# Patient Record
Sex: MALE | Race: ASIAN | NOT HISPANIC OR LATINO | Employment: FULL TIME | URBAN - METROPOLITAN AREA
[De-identification: names, ages, dates, MRNs, and addresses within clinical notes are randomized per-mention and may not be internally consistent; named-entity substitution may affect disease eponyms.]

---

## 2017-01-28 ENCOUNTER — HOSPITAL ENCOUNTER (OUTPATIENT)
Dept: LAB | Facility: MEDICAL CENTER | Age: 42
End: 2017-01-28
Attending: FAMILY MEDICINE
Payer: COMMERCIAL

## 2017-01-28 DIAGNOSIS — E87.6 HYPOKALEMIA: ICD-10-CM

## 2017-01-28 DIAGNOSIS — E78.5 DYSLIPIDEMIA: ICD-10-CM

## 2017-01-28 LAB
ALBUMIN SERPL BCP-MCNC: 4.7 G/DL (ref 3.2–4.9)
ALBUMIN/GLOB SERPL: 1.7 G/DL
ALP SERPL-CCNC: 56 U/L (ref 30–99)
ALT SERPL-CCNC: 32 U/L (ref 2–50)
ANION GAP SERPL CALC-SCNC: 10 MMOL/L (ref 0–11.9)
AST SERPL-CCNC: 30 U/L (ref 12–45)
BILIRUB SERPL-MCNC: 0.7 MG/DL (ref 0.1–1.5)
BUN SERPL-MCNC: 17 MG/DL (ref 8–22)
CALCIUM SERPL-MCNC: 9.2 MG/DL (ref 8.5–10.5)
CHLORIDE SERPL-SCNC: 104 MMOL/L (ref 96–112)
CHOLEST SERPL-MCNC: 143 MG/DL (ref 100–199)
CO2 SERPL-SCNC: 26 MMOL/L (ref 20–33)
CREAT SERPL-MCNC: 0.99 MG/DL (ref 0.5–1.4)
GLOBULIN SER CALC-MCNC: 2.7 G/DL (ref 1.9–3.5)
GLUCOSE SERPL-MCNC: 89 MG/DL (ref 65–99)
HDLC SERPL-MCNC: 46 MG/DL
LDLC SERPL CALC-MCNC: 86 MG/DL
POTASSIUM SERPL-SCNC: 3.4 MMOL/L (ref 3.6–5.5)
PROT SERPL-MCNC: 7.4 G/DL (ref 6–8.2)
SODIUM SERPL-SCNC: 140 MMOL/L (ref 135–145)
TRIGL SERPL-MCNC: 57 MG/DL (ref 0–149)

## 2017-01-28 PROCEDURE — 80053 COMPREHEN METABOLIC PANEL: CPT

## 2017-01-28 PROCEDURE — 80061 LIPID PANEL: CPT

## 2017-01-28 PROCEDURE — 36415 COLL VENOUS BLD VENIPUNCTURE: CPT

## 2017-02-06 ENCOUNTER — OFFICE VISIT (OUTPATIENT)
Dept: MEDICAL GROUP | Facility: PHYSICIAN GROUP | Age: 42
End: 2017-02-06
Payer: COMMERCIAL

## 2017-02-06 VITALS
BODY MASS INDEX: 25.48 KG/M2 | RESPIRATION RATE: 18 BRPM | OXYGEN SATURATION: 96 % | DIASTOLIC BLOOD PRESSURE: 80 MMHG | TEMPERATURE: 98.4 F | SYSTOLIC BLOOD PRESSURE: 152 MMHG | WEIGHT: 182 LBS | HEART RATE: 67 BPM | HEIGHT: 71 IN

## 2017-02-06 DIAGNOSIS — E78.5 DYSLIPIDEMIA: ICD-10-CM

## 2017-02-06 DIAGNOSIS — I10 ESSENTIAL HYPERTENSION: ICD-10-CM

## 2017-02-06 DIAGNOSIS — Z00.00 ROUTINE PHYSICAL EXAMINATION: ICD-10-CM

## 2017-02-06 DIAGNOSIS — E87.6 HYPOKALEMIA: ICD-10-CM

## 2017-02-06 PROCEDURE — 99396 PREV VISIT EST AGE 40-64: CPT | Performed by: FAMILY MEDICINE

## 2017-02-06 RX ORDER — AMLODIPINE BESYLATE 2.5 MG/1
2.5 TABLET ORAL DAILY
Qty: 30 TAB | Refills: 2 | Status: SHIPPED | OUTPATIENT
Start: 2017-02-06 | End: 2017-03-20 | Stop reason: SDUPTHER

## 2017-02-06 NOTE — MR AVS SNAPSHOT
"        Bertrand Platt   2017 9:20 AM   Office Visit   MRN: [id number]    Department:  [facility]   Dept Phone:  [phone]    Description:  Male : 1975   Provider:  Allyssa Cortez M.D.           Reason for Visit     Follow-Up     Results labs       Allergies as of 2017     No Known Allergies      You were diagnosed with     Routine physical examination   [828249]       Essential hypertension   [6580771]       Dyslipidemia   [983393]         Vital Signs     Blood Pressure Pulse Temperature Respirations Height Weight    152/80 mmHg 67 36.9 °C (98.4 °F) 18 1.803 m (5' 11\") 82.555 kg (182 lb)    Body Mass Index Oxygen Saturation Smoking Status             25.40 kg/m2 96% Former Smoker         Basic Information     Date Of Birth Sex Race Ethnicity Preferred Language    1975 Male  Non- English      Your appointments     Mar 20, 2017  9:40 AM   Established Patient with Allyssa Cortez M.D.   [facility] (--)    [address]  [address]  [facility] [id number] [phone]           You will be receiving a confirmation call a few days before your appointment from our automated call confirmation system.              Problem List              ICD-10-CM Priority Class Noted - Resolved    Dyslipidemia E78.5   2014 - Present    Essential hypertension I10   2016 - Present      Health Maintenance        Date Due Completion Dates    IMM INFLUENZA (1) 2018 (Originally 2016) 10/27/2014    IMM DTaP/Tdap/Td Vaccine (2 - Td) 2026            Current Immunizations     Influenza Vaccine Quad Inj (Pf) 10/27/2014    Tdap Vaccine 2016      Below and/or attached are the medications your provider expects you to take. Review all of your home medications and newly ordered medications with your provider and/or pharmacist. Follow medication instructions as directed by your provider and/or pharmacist. Please keep your medication list with you and share with " your provider. Update the information when medications are discontinued, doses are changed, or new medications (including over-the-counter products) are added; and carry medication information at all times in the event of emergency situations     Allergies:  No Known Allergies          Medications  Valid as of: February 06, 2017 -  9:47 AM    Generic Name Brand Name Tablet Size Instructions for use    AmLODIPine Besylate (Tab) NORVASC 2.5 MG Take 1 Tab by mouth every day.        Cephalexin (Cap) KEFLEX 500 MG Take 1 Cap by mouth 4 times a day.        Lisinopril (Tab) PRINIVIL, ZESTRIL 40 MG Take 1 Tab by mouth every day.        .                 Medicines prescribed today were sent to:     [facility]/PHARMACY #[id number] - [place], [place] - [address] [facility] AT [address] [address]    Phone: [phone] Fax: [phone]    Open 24 Hours?: No      Medication refill instructions:       If your prescription bottle indicates you have medication refills left, it is not necessary to call your provider’s office. Please contact your pharmacy and they will refill your medication.    If your prescription bottle indicates you do not have any refills left, you may request refills at any time through one of the following ways: The online [employer] system (except Urgent Care), by calling your provider’s office, or by asking your pharmacy to contact your provider’s office with a refill request. Medication refills are processed only during regular business hours and may not be available until the next business day. Your provider may request additional information or to have a follow-up visit with you prior to refilling your medication.   *Please Note: Medication refills are assigned a new Rx number when refilled electronically. Your pharmacy may indicate that no refills were authorized even though a new prescription for the same medication is available at the pharmacy. Please request the medicine by name  with the pharmacy before contacting your provider for a refill.           MyChart Access Code: Activation code not generated  Current MyChart Status: Active

## 2017-02-06 NOTE — PROGRESS NOTES
Subjective:      Bertrand Platt is a 41 y.o. male who presents with Follow-Up and Results            HPI     Patient returns routine physical exam and for follow-up of his medical problems and to go over his lab results.    In the past he was on medication for hypertension. He was able to control his blood pressure without the need for medication. When I saw him 4 months ago his blood pressure was already slightly elevated. I did not start him back on the medication at that time and I had him work on getting this controlled with his on efforts. He has not been checking his blood pressure at home. His blood pressure is higher today than the last visit. He denies any headache, dizziness, lightheadedness, chest pain, palpitations, shortness of breath, leg edema.    We also follow him for his dyslipidemia. He is managing this with his own efforts.    I reviewed the following    Past Medical History   Diagnosis Date   • HTN (hypertension)         No past surgical history on file.    No Known Allergies    Current Outpatient Prescriptions   Medication Sig Dispense Refill   • amlodipine (NORVASC) 2.5 MG Tab Take 1 Tab by mouth every day. 30 Tab 2   • cephALEXin (KEFLEX) 500 MG CAPS Take 1 Cap by mouth 4 times a day. 40 Cap 0   • lisinopril (PRINIVIL, ZESTRIL) 40 MG tablet Take 1 Tab by mouth every day. 30 Tab 5     No current facility-administered medications for this visit.        Family History   Problem Relation Age of Onset   • Hyperlipidemia Mother    • Hypertension Mother    • Diabetes Mother        Social History     Social History   • Marital Status:      Spouse Name: N/A   • Number of Children: 2   • Years of Education: N/A     Occupational History   •  [employer]     Social History Main Topics   • Smoking status: Former Smoker -- 4 years     Types: Cigarettes     Quit date: 06/08/2002   • Smokeless tobacco: Never Used      Comment: smoked 3 cig/day, quit in 2003   • Alcohol Use: 0.0  "oz/week     0 Standard drinks or equivalent per week      Comment: occasional   • Drug Use: No   • Sexual Activity:     Partners: Female     Other Topics Concern   • Not on file     Social History Narrative        ROS    Review of Systems  Constitutional: Negative for fever, chills, weight loss and malaise/fatigue.   HEENT: Negative for ear pain, nosebleeds, congestion, sore throat and neck pain.    Eyes: Negative for blurred vision.   Respiratory: Negative for cough, sputum production, shortness of breath and wheezing.    Cardiovascular: Negative for chest pain, palpitations, orthopnea and leg swelling.   Gastrointestinal: Negative for heartburn, nausea, vomiting and abdominal pain.   Genitourinary: Negative for dysuria, urgency and frequency.   Musculoskeletal: Negative for myalgias, back pain and joint pain.   Skin: Negative for rash and itching.   Neurological: Negative for dizziness, tingling, tremors, sensory change, focal weakness and headaches.   Endo/Heme/Allergies: Does not bruise/bleed easily.   Psychiatric/Behavioral: Negative for depression, anxiety, or memory loss.     All other systems reviewed and are negative except as in HPI.     Objective:     /80 mmHg  Pulse 67  Temp(Src) 36.9 °C (98.4 °F)  Resp 18  Ht 1.803 m (5' 11\")  Wt 82.555 kg (182 lb)  BMI 25.40 kg/m2  SpO2 96%     Physical Exam   Constitutional: He is oriented to person, place, and time. He appears well-developed and well-nourished. No distress.   HENT:   Head: Normocephalic and atraumatic.   Right Ear: External ear normal.   Left Ear: External ear normal.   Nose: Nose normal.   Mouth/Throat: Oropharynx is clear and moist. No oropharyngeal exudate.   Eyes: Conjunctivae and EOM are normal. Pupils are equal, round, and reactive to light. Right eye exhibits no discharge. Left eye exhibits no discharge. No scleral icterus.   Neck: Normal range of motion. Neck supple. No JVD present. No tracheal deviation present. No thyromegaly " present.   Cardiovascular: Normal rate, regular rhythm, normal heart sounds and intact distal pulses.  Exam reveals no gallop and no friction rub.    No murmur heard.  Pulmonary/Chest: Effort normal. No stridor. No respiratory distress. He has no wheezes. He has no rales. He exhibits no tenderness.   Abdominal: Soft. Bowel sounds are normal. He exhibits no distension and no mass. There is no tenderness. There is no rebound and no guarding. No hernia. Hernia confirmed negative in the right inguinal area and confirmed negative in the left inguinal area.   Genitourinary: Testes normal and penis normal. Right testis shows no mass, no swelling and no tenderness. Right testis is descended. Left testis shows no mass, no swelling and no tenderness. Left testis is descended. Circumcised. No penile erythema or penile tenderness. No discharge found.   Musculoskeletal: Normal range of motion. He exhibits no edema or tenderness.   Lymphadenopathy:     He has no cervical adenopathy. No inguinal adenopathy noted on the right or left side.   Neurological: He is alert and oriented to person, place, and time. He has normal reflexes. He displays normal reflexes. No cranial nerve deficit. He exhibits normal muscle tone. Coordination normal.   Skin: Skin is warm. No rash noted. He is not diaphoretic. No erythema. No pallor.   Psychiatric: He has a normal mood and affect. His behavior is normal. Judgment and thought content normal.        Hospital Outpatient Visit on 01/28/2017   Component Date Value   • Cholesterol,Tot 01/28/2017 143    • Triglycerides 01/28/2017 57    • HDL 01/28/2017 46    • LDL 01/28/2017 86    • Sodium 01/28/2017 140    • Potassium 01/28/2017 3.4*previously 3.5    • Chloride 01/28/2017 104    • Co2 01/28/2017 26    • Anion Gap 01/28/2017 10.0    • Glucose 01/28/2017 89    • Bun 01/28/2017 17    • Creatinine 01/28/2017 0.99    • Calcium 01/28/2017 9.2    • AST(SGOT) 01/28/2017 30    • ALT(SGPT) 01/28/2017 32    •  Alkaline Phosphatase 01/28/2017 56    • Total Bilirubin 01/28/2017 0.7    • Albumin 01/28/2017 4.7    • Total Protein 01/28/2017 7.4    • Globulin 01/28/2017 2.7    • A-G Ratio 01/28/2017 1.7    • GFR If  01/28/2017 >60    • GFR If Non  Ameri* 01/28/2017 >60                Assessment/Plan:     1. Routine physical examination  Complete exam was done. Up-to-date with tdap. He refuses flu shot.    2. Essential hypertension  Blood pressure is consistently slightly high without the medications. I will start him back on low-dose amlodipine 2.5 mg one tablet daily. Monitor blood pressures at home and keep a record and bring it on his follow-up. I will reevaluate in a month. Advised exercise, watching the salt intake.  - amlodipine (NORVASC) 2.5 MG Tab; Take 1 Tab by mouth every day.  Dispense: 30 Tab; Refill: 2    3. Dyslipidemia  All at target. LDL is even better than before. Ten-year cardiovascular disease risk is 1.4%. No need to treat with medication.    4. Hypokalemia.  No recent diarrhea, vomiting. He is not on medication that would make him lose potassium. Advised increase potassium-rich foods. I will do follow-up blood work to check the potassium next visit.      Please note that this dictation was created using voice recognition software. I have worked with consultants from the vendor as well as technical experts from [facility]  [employer] to optimize the interface. I have made every reasonable attempt to correct obvious errors, but I expect that there are errors of grammar and possibly content I did not discover before finalizing the note.

## 2017-03-13 ENCOUNTER — HOSPITAL ENCOUNTER (OUTPATIENT)
Dept: LAB | Facility: MEDICAL CENTER | Age: 42
End: 2017-03-13
Attending: FAMILY MEDICINE
Payer: COMMERCIAL

## 2017-03-13 DIAGNOSIS — E87.6 HYPOKALEMIA: ICD-10-CM

## 2017-03-13 LAB — POTASSIUM SERPL-SCNC: 3.6 MMOL/L (ref 3.6–5.5)

## 2017-03-13 PROCEDURE — 36415 COLL VENOUS BLD VENIPUNCTURE: CPT

## 2017-03-13 PROCEDURE — 84132 ASSAY OF SERUM POTASSIUM: CPT

## 2017-03-20 ENCOUNTER — OFFICE VISIT (OUTPATIENT)
Dept: MEDICAL GROUP | Facility: PHYSICIAN GROUP | Age: 42
End: 2017-03-20
Payer: COMMERCIAL

## 2017-03-20 VITALS
OXYGEN SATURATION: 94 % | WEIGHT: 182 LBS | TEMPERATURE: 98.2 F | HEIGHT: 71 IN | SYSTOLIC BLOOD PRESSURE: 130 MMHG | HEART RATE: 69 BPM | DIASTOLIC BLOOD PRESSURE: 86 MMHG | RESPIRATION RATE: 16 BRPM | BODY MASS INDEX: 25.48 KG/M2

## 2017-03-20 DIAGNOSIS — I10 ESSENTIAL HYPERTENSION: ICD-10-CM

## 2017-03-20 DIAGNOSIS — E87.6 HYPOKALEMIA: ICD-10-CM

## 2017-03-20 PROCEDURE — 99213 OFFICE O/P EST LOW 20 MIN: CPT | Performed by: FAMILY MEDICINE

## 2017-03-20 RX ORDER — AMLODIPINE BESYLATE 2.5 MG/1
2.5 TABLET ORAL DAILY
Qty: 30 TAB | Refills: 5 | Status: SHIPPED | OUTPATIENT
Start: 2017-03-20 | End: 2017-10-05 | Stop reason: SDUPTHER

## 2017-03-20 ASSESSMENT — PAIN SCALES - GENERAL: PAINLEVEL: NO PAIN

## 2017-03-20 NOTE — MR AVS SNAPSHOT
"Bertrand Platt   3/20/2017 9:40 AM   Office Visit   MRN: [id number]    Department:  [facility]   Dept Phone:  [phone]    Description:  Male : 1975   Provider:  Allyssa Cortez M.D.           Reason for Visit     Follow-Up 1 Month       Allergies as of 3/20/2017     No Known Allergies      You were diagnosed with     Essential hypertension   [0160331]       Hypokalemia   [[id number]]         Vital Signs     Blood Pressure Pulse Temperature Respirations Height Weight    130/86 mmHg 69 36.8 °C (98.2 °F) 16 1.803 m (5' 11\") 82.555 kg (182 lb)    Body Mass Index Oxygen Saturation Smoking Status             25.40 kg/m2 94% Former Smoker         Basic Information     Date Of Birth Sex Race Ethnicity Preferred Language    1975 Male  Non- English      Your appointments     2017 10:00 AM   Established Patient with Allyssa Cortez M.D.   [facility] (--)    [address]  [address]  [facility] [id number] [phone]           You will be receiving a confirmation call a few days before your appointment from our automated call confirmation system.              Problem List              ICD-10-CM Priority Class Noted - Resolved    Dyslipidemia E78.5   2014 - Present    Essential hypertension I10   2016 - Present      Health Maintenance        Date Due Completion Dates    IMM INFLUENZA (1) 2018 (Originally 2016) 10/27/2014    IMM DTaP/Tdap/Td Vaccine (2 - Td) 2026            Current Immunizations     Influenza Vaccine Quad Inj (Pf) 10/27/2014    Tdap Vaccine 2016      Below and/or attached are the medications your provider expects you to take. Review all of your home medications and newly ordered medications with your provider and/or pharmacist. Follow medication instructions as directed by your provider and/or pharmacist. Please keep your medication list with you and share with your provider. Update the information when medications " are discontinued, doses are changed, or new medications (including over-the-counter products) are added; and carry medication information at all times in the event of emergency situations     Allergies:  No Known Allergies          Medications  Valid as of: March 20, 2017 -  9:51 AM    Generic Name Brand Name Tablet Size Instructions for use    AmLODIPine Besylate (Tab) NORVASC 2.5 MG Take 1 Tab by mouth every day.        Cephalexin (Cap) KEFLEX 500 MG Take 1 Cap by mouth 4 times a day.        Lisinopril (Tab) PRINIVIL, ZESTRIL 40 MG Take 1 Tab by mouth every day.        .                 Medicines prescribed today were sent to:     [facility]/PHARMACY #[id number] - [place], [place] - [address] [place] [address]    Phone: [phone] Fax: [phone]    Open 24 Hours?: No      Medication refill instructions:       If your prescription bottle indicates you have medication refills left, it is not necessary to call your provider’s office. Please contact your pharmacy and they will refill your medication.    If your prescription bottle indicates you do not have any refills left, you may request refills at any time through one of the following ways: The online [employer] system (except Urgent Care), by calling your provider’s office, or by asking your pharmacy to contact your provider’s office with a refill request. Medication refills are processed only during regular business hours and may not be available until the next business day. Your provider may request additional information or to have a follow-up visit with you prior to refilling your medication.   *Please Note: Medication refills are assigned a new Rx number when refilled electronically. Your pharmacy may indicate that no refills were authorized even though a new prescription for the same medication is available at the pharmacy. Please request the medicine by name with the pharmacy before contacting your provider for a  refill.           MyChart Access Code: Activation code not generated  Current [employer] Status: Active

## 2017-03-20 NOTE — PROGRESS NOTES
"Subjective:      Bertrand Platt is a 42 y.o. male who presents with Follow-Up            HPI     Patient returns for follow-up. I started him on low-dose amlodipine for hypertension about a month and a half ago since his blood pressure started to run high. In the past he was on 2 agents which were lisinopril and amlodipine but he was able to get his blood pressure under control with his own efforts and so we discontinued the medications. He has been checking his blood pressures at home and he said most of the time they're running in the 130s over 70s to 80s. Occasionally it goes up to 139/85-87 and one time diastolic was 92 which was the highest reading. He denies any ankle or leg swelling from the medication. He denies any headache, dizziness, lightheadedness, chest pain, palpitations, shortness of breath.    We also had him do follow-up blood work because he had slightly low potassium level. He did not have any diarrhea or vomiting that would explain the low number. I had him eat potassium-rich foods.    Past medical history, past surgical history, family history reviewed-no changes    Social history reviewed-no changes    Allergies reviewed-no changes    Medications reviewed-no changes    ROS     Review of systems as per history of present illness, the rest are negative.       Objective:     /86 mmHg  Pulse 69  Temp(Src) 36.8 °C (98.2 °F)  Resp 16  Ht 1.803 m (5' 11\")  Wt 82.555 kg (182 lb)  BMI 25.40 kg/m2  SpO2 94%     Physical Exam     Examined alert, awake, oriented, not in distress    Neck-supple, no lymphadenopathy, no thyromegaly  Lungs-clear to auscultation, no rales, no wheezes  Heart-regular rate and rhythm, no murmur  Extremities-no edema, clubbing, cyanosis     Hospital Outpatient Visit on 03/13/2017   Component Date Value   • Potassium 03/13/2017 3.6         Assessment/Plan:     1. Essential hypertension  Blood pressure is now down to goal. Continue amlodipine and he is only on 2.5 mg " daily. I will recheck in 3 months. He will continue to monitor his blood pressures at home and keep a record and bring on his return. Advised watching his salt intake, regular exercise and weight loss.  - amlodipine (NORVASC) 2.5 MG Tab; Take 1 Tab by mouth every day.  Dispense: 30 Tab; Refill: 5    2. Hypokalemia  This is now normal. Continue to eat potassium-rich foods.      Please note that this dictation was created using voice recognition software. I have worked with consultants from the vendor as well as technical experts from [employer] to optimize the interface. I have made every reasonable attempt to correct obvious errors, but I expect that there are errors of grammar and possibly content I did not discover before finalizing the note.

## 2017-07-03 ENCOUNTER — OFFICE VISIT (OUTPATIENT)
Dept: MEDICAL GROUP | Facility: PHYSICIAN GROUP | Age: 42
End: 2017-07-03
Payer: COMMERCIAL

## 2017-07-03 VITALS
OXYGEN SATURATION: 97 % | HEART RATE: 55 BPM | HEIGHT: 71 IN | BODY MASS INDEX: 25.62 KG/M2 | WEIGHT: 183 LBS | DIASTOLIC BLOOD PRESSURE: 74 MMHG | TEMPERATURE: 98.6 F | RESPIRATION RATE: 16 BRPM | SYSTOLIC BLOOD PRESSURE: 130 MMHG

## 2017-07-03 DIAGNOSIS — E78.5 DYSLIPIDEMIA: ICD-10-CM

## 2017-07-03 DIAGNOSIS — I10 ESSENTIAL HYPERTENSION: ICD-10-CM

## 2017-07-03 PROCEDURE — 99213 OFFICE O/P EST LOW 20 MIN: CPT | Performed by: FAMILY MEDICINE

## 2017-07-03 NOTE — MR AVS SNAPSHOT
"        Bertrand Platt   7/3/2017 10:00 AM   Office Visit   MRN: [id number]    Department:  [facility]   Dept Phone:  [phone]    Description:  Male : 1975   Provider:  Allyssa Cortez M.D.           Reason for Visit     Follow-Up 3 month follow up     Medication Refill           Allergies as of 7/3/2017     No Known Allergies      You were diagnosed with     Essential hypertension   [2913911]       Dyslipidemia   [459170]         Vital Signs     Blood Pressure Pulse Temperature Respirations Height Weight    130/74 mmHg 55 37 °C (98.6 °F) 16 1.803 m (5' 11\") 83.008 kg (183 lb)    Body Mass Index Oxygen Saturation Smoking Status             25.53 kg/m2 97% Former Smoker         Basic Information     Date Of Birth Sex Race Ethnicity Preferred Language    1975 Male  Non- English      Your appointments     2018 10:00 AM   Established Patient with Allyssa Cortez M.D.   [facility] (--)    [address]  [address]  [facility] [id number] [phone]           You will be receiving a confirmation call a few days before your appointment from our automated call confirmation system.              Problem List              ICD-10-CM Priority Class Noted - Resolved    Dyslipidemia E78.5   2014 - Present    Essential hypertension I10   2016 - Present      Health Maintenance        Date Due Completion Dates    IMM INFLUENZA (1) 2018 (Originally 2017) 10/27/2014    IMM DTaP/Tdap/Td Vaccine (2 - Td) 2026            Current Immunizations     Influenza Vaccine Quad Inj (Pf) 10/27/2014    Tdap Vaccine 2016      Below and/or attached are the medications your provider expects you to take. Review all of your home medications and newly ordered medications with your provider and/or pharmacist. Follow medication instructions as directed by your provider and/or pharmacist. Please keep your medication list with you and share with your provider. Update " the information when medications are discontinued, doses are changed, or new medications (including over-the-counter products) are added; and carry medication information at all times in the event of emergency situations     Allergies:  No Known Allergies          Medications  Valid as of: July 03, 2017 - 10:38 AM    Generic Name Brand Name Tablet Size Instructions for use    AmLODIPine Besylate (Tab) NORVASC 2.5 MG Take 1 Tab by mouth every day.        Cephalexin (Cap) KEFLEX 500 MG Take 1 Cap by mouth 4 times a day.        Lisinopril (Tab) PRINIVIL, ZESTRIL 40 MG Take 1 Tab by mouth every day.        .                 Medicines prescribed today were sent to:     [facility]/PHARMACY #[id number] - [place], [place] - [address] [place] [address]    Phone: [phone] Fax: [phone]    Open 24 Hours?: No      Medication refill instructions:       If your prescription bottle indicates you have medication refills left, it is not necessary to call your provider’s office. Please contact your pharmacy and they will refill your medication.    If your prescription bottle indicates you do not have any refills left, you may request refills at any time through one of the following ways: The online [employer] system (except Urgent Care), by calling your provider’s office, or by asking your pharmacy to contact your provider’s office with a refill request. Medication refills are processed only during regular business hours and may not be available until the next business day. Your provider may request additional information or to have a follow-up visit with you prior to refilling your medication.   *Please Note: Medication refills are assigned a new Rx number when refilled electronically. Your pharmacy may indicate that no refills were authorized even though a new prescription for the same medication is available at the pharmacy. Please request the medicine by name with the pharmacy before  contacting your provider for a refill.        Your To Do List     Future Labs/Procedures Complete By Expires    CBC WITH DIFFERENTIAL  As directed 7/4/2018    COMP METABOLIC PANEL  As directed 7/4/2018    LIPID PROFILE  As directed 7/4/2018         MyChart Access Code: Activation code not generated  Current MyChart Status: Active

## 2017-07-04 NOTE — PROGRESS NOTES
"Subjective:      Bertrand Platt is a 42 y.o. male who presents with Follow-Up and Medication Refill            HPI     Patient is here for follow-up of his hypertension and dyslipidemia.    In terms of his hypertension, this is under control on low-dose amlodipine 2.5 mg daily. He denies any side effects. He denies any headache, dizziness, lightheadedness, chest pain, palpitations, shortness of breath, leg edema.    In terms of his dyslipidemia, he was able to get the LDL down from 130s to 80s with his own efforts only.    Past medical history, past surgical history, family history reviewed-no changes    Social history reviewed-no changes    Allergies reviewed-no changes    Medications reviewed-no changes    ROS     Review of systems as per history of present illness, the rest are negative.     Objective:     /74 mmHg  Pulse 55  Temp(Src) 37 °C (98.6 °F)  Resp 16  Ht 1.803 m (5' 11\")  Wt 83.008 kg (183 lb)  BMI 25.53 kg/m2  SpO2 97%     Physical Exam     Examined alert, awake, oriented, not in distress    Neck-supple, no lymphadenopathy, no thyromegaly  Lungs-clear to auscultation, no rales, no wheezes  Heart-regular rate and rhythm, no murmur  Extremities-no edema, clubbing, cyanosis            Assessment/Plan:     1. Essential hypertension  Controlled on low-dose amlodipine.  - LIPID PROFILE; Future  - COMP METABOLIC PANEL; Future  - CBC WITH DIFFERENTIAL; Future    2. Dyslipidemia  We will do follow-up blood work to make sure that this continues to be where it should be with his own efforts.  - LIPID PROFILE; Future  - COMP METABOLIC PANEL; Future  - CBC WITH DIFFERENTIAL; Future      Follow-up in 6 months.  "

## 2017-10-05 DIAGNOSIS — I10 ESSENTIAL HYPERTENSION: ICD-10-CM

## 2017-10-05 RX ORDER — AMLODIPINE BESYLATE 2.5 MG/1
2.5 TABLET ORAL DAILY
Qty: 90 TAB | Refills: 1 | Status: SHIPPED | OUTPATIENT
Start: 2017-10-05 | End: 2018-04-23 | Stop reason: SDUPTHER

## 2018-01-08 ENCOUNTER — OFFICE VISIT (OUTPATIENT)
Dept: MEDICAL GROUP | Facility: PHYSICIAN GROUP | Age: 43
End: 2018-01-08
Payer: COMMERCIAL

## 2018-01-08 VITALS
SYSTOLIC BLOOD PRESSURE: 138 MMHG | WEIGHT: 186 LBS | DIASTOLIC BLOOD PRESSURE: 88 MMHG | RESPIRATION RATE: 12 BRPM | TEMPERATURE: 97.9 F | HEIGHT: 71 IN | OXYGEN SATURATION: 98 % | BODY MASS INDEX: 26.04 KG/M2 | HEART RATE: 63 BPM

## 2018-01-08 DIAGNOSIS — E78.5 DYSLIPIDEMIA: ICD-10-CM

## 2018-01-08 DIAGNOSIS — I10 ESSENTIAL HYPERTENSION: ICD-10-CM

## 2018-01-08 PROCEDURE — 99213 OFFICE O/P EST LOW 20 MIN: CPT | Performed by: FAMILY MEDICINE

## 2018-01-08 ASSESSMENT — PAIN SCALES - GENERAL: PAINLEVEL: NO PAIN

## 2018-01-08 ASSESSMENT — PATIENT HEALTH QUESTIONNAIRE - PHQ9: CLINICAL INTERPRETATION OF PHQ2 SCORE: 0

## 2018-01-09 NOTE — PROGRESS NOTES
"Subjective:      Bertrand Platt is a 42 y.o. male who presents with Follow-Up (6 Month )            HPI     Patient is here for follow-up of his medical problems.    In terms of his hypertension, he continues to take amlodipine 2.5 mg daily without side effects. He has not been checking his blood pressure at home. Today his diastolic blood pressure is borderline elevated.    In terms of the dyslipidemia, he is managing this with his own efforts. He was able to get LDL down from 131-86 in January 2017 blood work. He has not done the follow-up blood work I ordered to be done before this visit.     Past medical history, past surgical history, family history reviewed-no changes    Social history reviewed-no changes    Allergies reviewed-no changes    Medications reviewed-no changes    ROS     Review of systems as per history of present illness, the rest are negative.       Objective:     /88   Pulse 63   Temp 36.6 °C (97.9 °F)   Resp 12   Ht 1.803 m (5' 11\")   Wt 84.4 kg (186 lb)   SpO2 98%   BMI 25.94 kg/m²      Physical Exam     Examined alert, awake, oriented, not in distress    Neck-supple, no lymphadenopathy, no thyromegaly  Lungs-clear to auscultation, no rales, no wheezes  Heart-regular rate and rhythm, no murmur  Extremities-no edema, clubbing, cyanosis          Assessment/Plan:     1. Essential hypertension  We need to get this adequately controlled. He will monitor his blood pressures at home and keep a record and bring it on his follow-up. Advised to watch the salt intake, exercise regularly and keep a healthy weight. If we don't see blood pressure improvement next visit we will increase the dose of the amlodipine.    2. Dyslipidemia  He will do follow-up blood work as soon as possible to check his lipid panel. We will calculate 10 year cardiovascular disease risk and advise statin if needed.      Please note that this dictation was created using voice recognition software. I have worked with " consultants from the vendor as well as technical experts from [facility] to optimize the interface. I have made every reasonable attempt to correct obvious errors, but I expect that there are errors of grammar and possibly content I did not discover before finalizing the note.

## 2018-04-23 DIAGNOSIS — I10 ESSENTIAL HYPERTENSION: ICD-10-CM

## 2018-04-23 RX ORDER — AMLODIPINE BESYLATE 2.5 MG/1
2.5 TABLET ORAL DAILY
Qty: 90 TAB | Refills: 1 | Status: SHIPPED | OUTPATIENT
Start: 2018-04-23 | End: 2018-09-30 | Stop reason: SDUPTHER

## 2018-04-23 RX ORDER — AMLODIPINE BESYLATE 2.5 MG/1
2.5 TABLET ORAL DAILY
Qty: 90 TAB | Refills: 0 | Status: CANCELLED | OUTPATIENT
Start: 2018-04-23

## 2018-04-23 NOTE — TELEPHONE ENCOUNTER
Pt called asking about medication refill but didn't give a name of medication.  Called and lvm for him to return my call so we can get his medication

## 2018-05-07 ENCOUNTER — HOSPITAL ENCOUNTER (OUTPATIENT)
Dept: LAB | Facility: MEDICAL CENTER | Age: 43
End: 2018-05-07
Attending: FAMILY MEDICINE
Payer: COMMERCIAL

## 2018-05-07 DIAGNOSIS — I10 ESSENTIAL HYPERTENSION: ICD-10-CM

## 2018-05-07 DIAGNOSIS — E78.5 DYSLIPIDEMIA: ICD-10-CM

## 2018-05-07 LAB
ALBUMIN SERPL BCP-MCNC: 4.4 G/DL (ref 3.2–4.9)
ALBUMIN/GLOB SERPL: 1.5 G/DL
ALP SERPL-CCNC: 51 U/L (ref 30–99)
ALT SERPL-CCNC: 41 U/L (ref 2–50)
ANION GAP SERPL CALC-SCNC: 10 MMOL/L (ref 0–11.9)
AST SERPL-CCNC: 32 U/L (ref 12–45)
BASOPHILS # BLD AUTO: 0.5 % (ref 0–1.8)
BASOPHILS # BLD: 0.03 K/UL (ref 0–0.12)
BILIRUB SERPL-MCNC: 0.7 MG/DL (ref 0.1–1.5)
BUN SERPL-MCNC: 21 MG/DL (ref 8–22)
CALCIUM SERPL-MCNC: 9.3 MG/DL (ref 8.5–10.5)
CHLORIDE SERPL-SCNC: 106 MMOL/L (ref 96–112)
CHOLEST SERPL-MCNC: 147 MG/DL (ref 100–199)
CO2 SERPL-SCNC: 23 MMOL/L (ref 20–33)
CREAT SERPL-MCNC: 1 MG/DL (ref 0.5–1.4)
EOSINOPHIL # BLD AUTO: 0.12 K/UL (ref 0–0.51)
EOSINOPHIL NFR BLD: 1.9 % (ref 0–6.9)
ERYTHROCYTE [DISTWIDTH] IN BLOOD BY AUTOMATED COUNT: 38.9 FL (ref 35.9–50)
GLOBULIN SER CALC-MCNC: 2.9 G/DL (ref 1.9–3.5)
GLUCOSE SERPL-MCNC: 100 MG/DL (ref 65–99)
HCT VFR BLD AUTO: 48 % (ref 42–52)
HDLC SERPL-MCNC: 43 MG/DL
HGB BLD-MCNC: 16.3 G/DL (ref 14–18)
IMM GRANULOCYTES # BLD AUTO: 0.02 K/UL (ref 0–0.11)
IMM GRANULOCYTES NFR BLD AUTO: 0.3 % (ref 0–0.9)
LDLC SERPL CALC-MCNC: 73 MG/DL
LYMPHOCYTES # BLD AUTO: 1.99 K/UL (ref 1–4.8)
LYMPHOCYTES NFR BLD: 31.5 % (ref 22–41)
MCH RBC QN AUTO: 29.3 PG (ref 27–33)
MCHC RBC AUTO-ENTMCNC: 34 G/DL (ref 33.7–35.3)
MCV RBC AUTO: 86.3 FL (ref 81.4–97.8)
MONOCYTES # BLD AUTO: 0.38 K/UL (ref 0–0.85)
MONOCYTES NFR BLD AUTO: 6 % (ref 0–13.4)
NEUTROPHILS # BLD AUTO: 3.77 K/UL (ref 1.82–7.42)
NEUTROPHILS NFR BLD: 59.8 % (ref 44–72)
NRBC # BLD AUTO: 0 K/UL
NRBC BLD-RTO: 0 /100 WBC
PLATELET # BLD AUTO: 259 K/UL (ref 164–446)
PMV BLD AUTO: 9.9 FL (ref 9–12.9)
POTASSIUM SERPL-SCNC: 3.9 MMOL/L (ref 3.6–5.5)
PROT SERPL-MCNC: 7.3 G/DL (ref 6–8.2)
RBC # BLD AUTO: 5.56 M/UL (ref 4.7–6.1)
SODIUM SERPL-SCNC: 139 MMOL/L (ref 135–145)
TRIGL SERPL-MCNC: 153 MG/DL (ref 0–149)
WBC # BLD AUTO: 6.3 K/UL (ref 4.8–10.8)

## 2018-05-07 PROCEDURE — 80061 LIPID PANEL: CPT

## 2018-05-07 PROCEDURE — 80053 COMPREHEN METABOLIC PANEL: CPT

## 2018-05-07 PROCEDURE — 36415 COLL VENOUS BLD VENIPUNCTURE: CPT

## 2018-05-07 PROCEDURE — 85025 COMPLETE CBC W/AUTO DIFF WBC: CPT

## 2018-08-06 ENCOUNTER — OFFICE VISIT (OUTPATIENT)
Dept: MEDICAL GROUP | Facility: PHYSICIAN GROUP | Age: 43
End: 2018-08-06
Payer: COMMERCIAL

## 2018-08-06 VITALS
BODY MASS INDEX: 26.32 KG/M2 | WEIGHT: 188 LBS | RESPIRATION RATE: 12 BRPM | HEIGHT: 71 IN | TEMPERATURE: 97.9 F | HEART RATE: 72 BPM | SYSTOLIC BLOOD PRESSURE: 110 MMHG | DIASTOLIC BLOOD PRESSURE: 76 MMHG | OXYGEN SATURATION: 97 %

## 2018-08-06 DIAGNOSIS — I10 ESSENTIAL HYPERTENSION: ICD-10-CM

## 2018-08-06 DIAGNOSIS — M21.611 BILATERAL BUNIONS: ICD-10-CM

## 2018-08-06 DIAGNOSIS — E78.5 DYSLIPIDEMIA: ICD-10-CM

## 2018-08-06 DIAGNOSIS — M21.612 BILATERAL BUNIONS: ICD-10-CM

## 2018-08-06 PROCEDURE — 99214 OFFICE O/P EST MOD 30 MIN: CPT | Performed by: NURSE PRACTITIONER

## 2018-08-06 NOTE — ASSESSMENT & PLAN NOTE
Taking amlodipine 2.5 mg daily for the past 4 years.  BP today 110/76.  No chest pain. No shortness of breath.  Exercises approximately 30 minutes most days.

## 2018-08-06 NOTE — ASSESSMENT & PLAN NOTE
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  Noticed growth/bunions for several years, but recently they started to hurt.  Referral to podiatry requested.  No fever, chills or redness noted.

## 2018-08-06 NOTE — ASSESSMENT & PLAN NOTE
Triglycerides at 153.  Cholesterol 147 normal range.  Encouraged patient to exercise and watch white carbohydrates.

## 2018-08-06 NOTE — PROGRESS NOTES
"Bertrand Platt is a 43 y.o.[place] male here today to establish care and for evaluation and management of:    HPI:    Essential hypertension  Taking amlodipine 2.5 mg daily for the past 4 years.  BP today 110/76.  No chest pain. No shortness of breath.  Exercises approximately 30 minutes most days.     Dyslipidemia  Triglycerides at 153.  Cholesterol 147 normal range.  Encouraged patient to exercise and watch white carbohydrates.     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  Noticed growth/bunions for several years, but recently they started to hurt.  Referral to podiatry requested.  No fever, chills or redness noted.       Current medicines (including changes today)  Current Outpatient Prescriptions   Medication Sig Dispense Refill   • amLODIPine (NORVASC) 2.5 MG Tab Take 1 Tab by mouth every day. 90 Tab 1     No current facility-administered medications for this visit.        He  has a past medical history of HTN (hypertension) and Hypertension.    He  has no past surgical history on file.    Social History   Substance Use Topics   • Smoking status: Former Smoker     Years: 4.00     Types: Cigarettes     Quit date: 2002   • Smokeless tobacco: Never Used      Comment: smoked 3 cig/day, quit in    • Alcohol use No       Social History     Social History Narrative   • No narrative on file       Family History   Problem Relation Age of Onset   • Hyperlipidemia Mother    • Hypertension Mother    • Diabetes Mother        Family Status   Relation Status   • Mo Alive   • Fa    • Sis Alive   • Bro Alive   • Sis Alive   • Sis Alive         ROS  As stated in hpi  All other systems reviewed and are negative     Objective:     Blood pressure 110/76, pulse 72, temperature 36.6 °C (97.9 °F), resp. rate 12, height 1.803 m (5' 11\"), weight 85.3 kg (188 lb), SpO2 97 %. Body mass index is 26.22 kg/m².  Physical Exam:    Constitutional: Alert, no distress.  Skin: Warm, dry, good turgor, no " rashes in visible areas.  Eye: Equal, round and reactive, conjunctiva clear, lids normal.  ENMT: Lips without lesions, good dentition, oropharynx clear.  Neck: Trachea midline, no masses, no thyromegaly. No cervical or supraclavicular lymphadenopathy.  Respiratory: Unlabored respiratory effort, lungs clear to auscultation, no wheezes, no ronchi.  Cardiovascular: Normal S1, S2, no murmur, no edema.  Abdomen: Soft, non-tender, no masses, no hepatosplenomegaly.  Psych: Alert and oriented x3, normal affect and mood.  MSK:  Right foot has large bunion on Great toe.  Left smaller bunion noted.       Assessment and Plan:   The following treatment plan was discussed    1. Essential hypertension  This is a new problem to me.  Chronic, ongoing. Stable. BP at goal.  Continue amolodipine 2.5 mg daily.  Discussed dietary salt, sugars, daily exercise.  Monitor and follow.     2. Dyslipidemia  This is a new problem to me.  Chronic.  Triglycerides elevated.  Discussed diet, exercise watching white carbohydrates.  Non-drinker.  Monitor and follow.     3. Bilateral bunions  This is a new problem to me.  Chronic, ongoing, exacerbation with 12 hour work shifts.  Referral placed to podiatry.  Monitor.   - REFERRAL TO PODIATRY      Records requested.  Followup: Return in about 1 year (around 8/6/2019) for Annual.

## 2018-09-30 DIAGNOSIS — I10 ESSENTIAL HYPERTENSION: ICD-10-CM

## 2018-10-01 RX ORDER — AMLODIPINE BESYLATE 2.5 MG/1
2.5 TABLET ORAL DAILY
Qty: 90 TAB | Refills: 0 | Status: SHIPPED | OUTPATIENT
Start: 2018-10-01 | End: 2018-10-24 | Stop reason: SDUPTHER

## 2018-10-25 ENCOUNTER — PATIENT MESSAGE (OUTPATIENT)
Dept: MEDICAL GROUP | Facility: PHYSICIAN GROUP | Age: 43
End: 2018-10-25

## 2018-10-25 DIAGNOSIS — I10 ESSENTIAL HYPERTENSION: ICD-10-CM

## 2019-02-11 ENCOUNTER — OFFICE VISIT (OUTPATIENT)
Dept: MEDICAL GROUP | Facility: PHYSICIAN GROUP | Age: 44
End: 2019-02-11
Payer: COMMERCIAL

## 2019-02-11 VITALS
HEIGHT: 71 IN | WEIGHT: 182.32 LBS | BODY MASS INDEX: 25.52 KG/M2 | DIASTOLIC BLOOD PRESSURE: 90 MMHG | HEART RATE: 63 BPM | SYSTOLIC BLOOD PRESSURE: 140 MMHG | TEMPERATURE: 97.9 F | OXYGEN SATURATION: 97 %

## 2019-02-11 DIAGNOSIS — E78.5 DYSLIPIDEMIA: ICD-10-CM

## 2019-02-11 DIAGNOSIS — I10 ESSENTIAL HYPERTENSION: ICD-10-CM

## 2019-02-11 DIAGNOSIS — L72.3 SEBACEOUS CYST: ICD-10-CM

## 2019-02-11 PROCEDURE — 99214 OFFICE O/P EST MOD 30 MIN: CPT | Performed by: FAMILY MEDICINE

## 2019-02-11 RX ORDER — AMLODIPINE BESYLATE 5 MG/1
5 TABLET ORAL DAILY
Qty: 90 TAB | Refills: 1 | Status: SHIPPED | OUTPATIENT
Start: 2019-02-11 | End: 2019-05-20

## 2019-02-11 ASSESSMENT — PATIENT HEALTH QUESTIONNAIRE - PHQ9: CLINICAL INTERPRETATION OF PHQ2 SCORE: 0

## 2019-02-11 NOTE — PROGRESS NOTES
"Subjective:      Bertrand Platt is a 43 y.o. male who presents with Mass (Pimple like in groin area)        HPI:    Groin Mass  The patient is a 43 y.o. Male who presents with a small growth on his upper left medial thigh, that appeared 6 months ago. It has mildly grown in size. He states it is occasionally sensitive, but denies any pruritis. He describes the lesion as a bump, similar to a pimple. He notes that a relative was recently diagnosed with lymphoma, and he is worried the mass may be cancerous.     Hypertension  The last time I saw this patient was a year ago.  The patient's hypertension is managed with amlodipine 2.5mg daily.  He went to establish with KANIKA Wood in August 2018.  His last two refills were done byKANIKA sam.  He is mildly hypertensive today at 130/80. Repeat pressure is 140/100. He has not been checking his blood pressure at home. His last check was 6 months ago.     Dyslipidemia  Patient has history of mild dyslipidemia and he was able to get this under control with his own efforts.  The last lipid panel was in May 2018 and at that time the LDL was below 100 but the triglycerides were mildly elevated.    Vaccinations  The patient has not received an influenza vaccine this season, and does not want one. He notes that the last time he had a flu shot was in 2014, and it made him sick.  He is up-to-date with Tdap.        Past medical history, past surgical history, family history reviewed-no changes    Social history reviewed-no changes    Allergies reviewed-no changes    Medications reviewed-no changes      ROS:  As per the HPI as shown above, the rest are negative.       Objective:     /80 (BP Location: Left arm, Patient Position: Sitting, BP Cuff Size: Adult)   Pulse 63   Temp 36.6 °C (97.9 °F) (Temporal)   Ht 1.803 m (5' 11\")   Wt 82.7 kg (182 lb 5.1 oz)   SpO2 97%   BMI 25.43 kg/m²     Physical Exam  Examined alert, awake, oriented, not in distress    Skin- 1x1 cm " subcutaneous nodule, consistent with sebaceous cyst, to the left medial proximal thigh. No overlying erythema of the skin. Non-tender.  Neck-supple, no lymphadenopathy, no thyromegaly  Lungs-clear to auscultation, no rales, no wheezes  Heart-regular rate and rhythm, no murmur  Extremities-no edema, clubbing, cyanosis       Assessment/Plan:   1. Sebaceous cyst  I believe the patient's groin mass is a small sebaceous cyst, 1x1 cm in diameter. I am not concerned for cancer or STDs. No intervention needed at this time.  Discussed with patient the natural course of the problem.  There is no potential for this to become cancerous or malignant.  We can keep an eye on this since this is small and he has no significant discomfort or pain.  - Lipid Profile; Future  - Comp Metabolic Panel; Future  - CBC WITH DIFFERENTIAL; Future    2. Essential hypertension  The patient's blood pressure was elevated today at 130/80. Repeat pressure was 140/100.  Made aware that with the new 2017 guidelines we need to get the blood pressure below 130/80.  I will increase the patient's dose of amlodipine to 5mg daily.  Monitor blood pressure at home and keep a record.  I will reevaluate in 2 months.  We will send him for blood work.  - Lipid Profile; Future  - Comp Metabolic Panel; Future  - CBC WITH DIFFERENTIAL; Future  - amLODIPine (NORVASC) 5 MG Tab; Take 1 Tab by mouth every day.  Dispense: 90 Tab; Refill: 1    3. Dyslipidemia  The patient's blood sugar and triglycerides were mildly elevated in May of 2018. I will recheck his lab work to check for signs of prediabetes and dyslipidemia.  - Lipid Profile; Future  - Comp Metabolic Panel; Future  - CBC WITH DIFFERENTIAL; Future        Follow up with me in 2 months, with repeat lab work.         Guadalupe VALLECILLO (Scribe), am scribing for, and in the presence of, Allyssa Cortez MD     Electronically signed by: Guadalupe Donahue (Scribe), 2/11/2019    Allyssa VALLECILLO MD personally performed  the services described in this documentation, as scribed by Guadalupe Donahue in my presence, and it is both accurate and complete.

## 2019-05-11 ENCOUNTER — HOSPITAL ENCOUNTER (OUTPATIENT)
Dept: LAB | Facility: MEDICAL CENTER | Age: 44
End: 2019-05-11
Attending: FAMILY MEDICINE
Payer: COMMERCIAL

## 2019-05-11 DIAGNOSIS — L72.3 SEBACEOUS CYST: ICD-10-CM

## 2019-05-11 DIAGNOSIS — I10 ESSENTIAL HYPERTENSION: ICD-10-CM

## 2019-05-11 DIAGNOSIS — E78.5 DYSLIPIDEMIA: ICD-10-CM

## 2019-05-11 LAB
ALBUMIN SERPL BCP-MCNC: 4.5 G/DL (ref 3.2–4.9)
ALBUMIN/GLOB SERPL: 1.6 G/DL
ALP SERPL-CCNC: 43 U/L (ref 30–99)
ALT SERPL-CCNC: 28 U/L (ref 2–50)
ANION GAP SERPL CALC-SCNC: 9 MMOL/L (ref 0–11.9)
AST SERPL-CCNC: 29 U/L (ref 12–45)
BASOPHILS # BLD AUTO: 0.4 % (ref 0–1.8)
BASOPHILS # BLD: 0.03 K/UL (ref 0–0.12)
BILIRUB SERPL-MCNC: 0.8 MG/DL (ref 0.1–1.5)
BUN SERPL-MCNC: 18 MG/DL (ref 8–22)
CALCIUM SERPL-MCNC: 9.2 MG/DL (ref 8.5–10.5)
CHLORIDE SERPL-SCNC: 105 MMOL/L (ref 96–112)
CHOLEST SERPL-MCNC: 161 MG/DL (ref 100–199)
CO2 SERPL-SCNC: 27 MMOL/L (ref 20–33)
CREAT SERPL-MCNC: 1.04 MG/DL (ref 0.5–1.4)
EOSINOPHIL # BLD AUTO: 0.2 K/UL (ref 0–0.51)
EOSINOPHIL NFR BLD: 2.6 % (ref 0–6.9)
ERYTHROCYTE [DISTWIDTH] IN BLOOD BY AUTOMATED COUNT: 40.6 FL (ref 35.9–50)
FASTING STATUS PATIENT QL REPORTED: NORMAL
GLOBULIN SER CALC-MCNC: 2.8 G/DL (ref 1.9–3.5)
GLUCOSE SERPL-MCNC: 78 MG/DL (ref 65–99)
HCT VFR BLD AUTO: 47.1 % (ref 42–52)
HDLC SERPL-MCNC: 48 MG/DL
HGB BLD-MCNC: 15.5 G/DL (ref 14–18)
IMM GRANULOCYTES # BLD AUTO: 0.02 K/UL (ref 0–0.11)
IMM GRANULOCYTES NFR BLD AUTO: 0.3 % (ref 0–0.9)
LDLC SERPL CALC-MCNC: 99 MG/DL
LYMPHOCYTES # BLD AUTO: 3.56 K/UL (ref 1–4.8)
LYMPHOCYTES NFR BLD: 46.5 % (ref 22–41)
MCH RBC QN AUTO: 29.2 PG (ref 27–33)
MCHC RBC AUTO-ENTMCNC: 32.9 G/DL (ref 33.7–35.3)
MCV RBC AUTO: 88.7 FL (ref 81.4–97.8)
MONOCYTES # BLD AUTO: 0.61 K/UL (ref 0–0.85)
MONOCYTES NFR BLD AUTO: 8 % (ref 0–13.4)
NEUTROPHILS # BLD AUTO: 3.24 K/UL (ref 1.82–7.42)
NEUTROPHILS NFR BLD: 42.2 % (ref 44–72)
NRBC # BLD AUTO: 0 K/UL
NRBC BLD-RTO: 0 /100 WBC
PLATELET # BLD AUTO: 266 K/UL (ref 164–446)
PMV BLD AUTO: 9.5 FL (ref 9–12.9)
POTASSIUM SERPL-SCNC: 3.8 MMOL/L (ref 3.6–5.5)
PROT SERPL-MCNC: 7.3 G/DL (ref 6–8.2)
RBC # BLD AUTO: 5.31 M/UL (ref 4.7–6.1)
SODIUM SERPL-SCNC: 141 MMOL/L (ref 135–145)
TRIGL SERPL-MCNC: 72 MG/DL (ref 0–149)
WBC # BLD AUTO: 7.7 K/UL (ref 4.8–10.8)

## 2019-05-11 PROCEDURE — 80053 COMPREHEN METABOLIC PANEL: CPT

## 2019-05-11 PROCEDURE — 80061 LIPID PANEL: CPT

## 2019-05-11 PROCEDURE — 36415 COLL VENOUS BLD VENIPUNCTURE: CPT

## 2019-05-11 PROCEDURE — 85025 COMPLETE CBC W/AUTO DIFF WBC: CPT

## 2019-05-16 ENCOUNTER — APPOINTMENT (OUTPATIENT)
Dept: MEDICAL GROUP | Facility: PHYSICIAN GROUP | Age: 44
End: 2019-05-16
Payer: COMMERCIAL

## 2019-05-20 ENCOUNTER — OFFICE VISIT (OUTPATIENT)
Dept: MEDICAL GROUP | Facility: PHYSICIAN GROUP | Age: 44
End: 2019-05-20
Payer: COMMERCIAL

## 2019-05-20 VITALS
HEART RATE: 66 BPM | HEIGHT: 71 IN | WEIGHT: 187.61 LBS | DIASTOLIC BLOOD PRESSURE: 80 MMHG | BODY MASS INDEX: 26.27 KG/M2 | TEMPERATURE: 97.8 F | SYSTOLIC BLOOD PRESSURE: 150 MMHG | OXYGEN SATURATION: 98 %

## 2019-05-20 DIAGNOSIS — I10 ESSENTIAL HYPERTENSION: ICD-10-CM

## 2019-05-20 DIAGNOSIS — E78.5 DYSLIPIDEMIA: ICD-10-CM

## 2019-05-20 PROCEDURE — 99213 OFFICE O/P EST LOW 20 MIN: CPT | Performed by: FAMILY MEDICINE

## 2019-05-20 RX ORDER — AMLODIPINE BESYLATE 10 MG/1
10 TABLET ORAL DAILY
Qty: 90 TAB | Refills: 1 | Status: SHIPPED | OUTPATIENT
Start: 2019-05-20 | End: 2019-12-01 | Stop reason: SDUPTHER

## 2019-05-20 NOTE — PROGRESS NOTES
"Subjective:      Bertrand Platt is a 44 y.o. male who presents with Follow-Up (htn)      HPI:    Patient presents today for 3-month follow-up of his chronic medical problems.     Hypertension  On his last visit, we increased his Amlodipine to 5 mg as his blood pressure was still elevated. He has since been taking this increased dosage without any side effects. He typically takes it around 4 in the afternoon as he works at night. Blood pressure levels in the office remain elevated at 150/80. Upon recheck, it is slightly lower at 140/90. He has not been checking his levels at home, but he does have a machine at home.     Dyslipidemia  He continues to manage his dyslipidemia through his own efforts. Blood work was completed prior to this visit.       Past medical history, past surgical history, family history reviewed-no changes    Social history reviewed-no changes    Allergies reviewed-no changes    Medications reviewed-no changes     ROS:  As per the HPI as shown above, the rest are negative.       Objective:     /80 (BP Location: Left arm, Patient Position: Sitting, BP Cuff Size: Adult)   Pulse 66   Temp 36.6 °C (97.8 °F) (Temporal)   Ht 1.803 m (5' 11\")   Wt 85.1 kg (187 lb 9.8 oz)   SpO2 98%   BMI 26.17 kg/m²     Physical Exam    Examined alert, awake, oriented, not in distress    Neck-supple, no lymphadenopathy, no thyromegaly  Lungs-clear to auscultation, no rales, no wheezes  Heart-regular rate and rhythm, no murmur  Extremities-no edema, clubbing, cyanosis      Labs:  Hospital Outpatient Visit on 05/11/2019   Component Date Value Ref Range Status   • Cholesterol,Tot 05/11/2019 161  100 - 199 mg/dL Final   • Triglycerides 05/11/2019 72  0 - 149 mg/dL Final   • HDL 05/11/2019 48  >=40 mg/dL Final   • LDL 05/11/2019 99  <100 mg/dL Final   • Sodium 05/11/2019 141  135 - 145 mmol/L Final   • Potassium 05/11/2019 3.8  3.6 - 5.5 mmol/L Final   • Chloride 05/11/2019 105  96 - 112 mmol/L Final   • Co2 " 05/11/2019 27  20 - 33 mmol/L Final   • Anion Gap 05/11/2019 9.0  0.0 - 11.9 Final   • Glucose 05/11/2019 78  65 - 99 mg/dL Final   • Bun 05/11/2019 18  8 - 22 mg/dL Final   • Creatinine 05/11/2019 1.04  0.50 - 1.40 mg/dL Final   • Calcium 05/11/2019 9.2  8.5 - 10.5 mg/dL Final   • AST(SGOT) 05/11/2019 29  12 - 45 U/L Final   • ALT(SGPT) 05/11/2019 28  2 - 50 U/L Final   • Alkaline Phosphatase 05/11/2019 43  30 - 99 U/L Final   • Total Bilirubin 05/11/2019 0.8  0.1 - 1.5 mg/dL Final   • Albumin 05/11/2019 4.5  3.2 - 4.9 g/dL Final   • Total Protein 05/11/2019 7.3  6.0 - 8.2 g/dL Final   • Globulin 05/11/2019 2.8  1.9 - 3.5 g/dL Final   • A-G Ratio 05/11/2019 1.6  g/dL Final   • WBC 05/11/2019 7.7  4.8 - 10.8 K/uL Final   • RBC 05/11/2019 5.31  4.70 - 6.10 M/uL Final   • Hemoglobin 05/11/2019 15.5  14.0 - 18.0 g/dL Final   • Hematocrit 05/11/2019 47.1  42.0 - 52.0 % Final   • MCV 05/11/2019 88.7  81.4 - 97.8 fL Final   • MCH 05/11/2019 29.2  27.0 - 33.0 pg Final   • MCHC 05/11/2019 32.9* 33.7 - 35.3 g/dL Final   • RDW 05/11/2019 40.6  35.9 - 50.0 fL Final   • Platelet Count 05/11/2019 266  164 - 446 K/uL Final   • MPV 05/11/2019 9.5  9.0 - 12.9 fL Final   • Neutrophils-Polys 05/11/2019 42.20* 44.00 - 72.00 % Final   • Lymphocytes 05/11/2019 46.50* 22.00 - 41.00 % Final   • Monocytes 05/11/2019 8.00  0.00 - 13.40 % Final   • Eosinophils 05/11/2019 2.60  0.00 - 6.90 % Final   • Basophils 05/11/2019 0.40  0.00 - 1.80 % Final   • Immature Granulocytes 05/11/2019 0.30  0.00 - 0.90 % Final   • Nucleated RBC 05/11/2019 0.00  /100 WBC Final   • Neutrophils (Absolute) 05/11/2019 3.24  1.82 - 7.42 K/uL Final    Includes immature neutrophils, if present.   • Lymphs (Absolute) 05/11/2019 3.56  1.00 - 4.80 K/uL Final   • Monos (Absolute) 05/11/2019 0.61  0.00 - 0.85 K/uL Final   • Eos (Absolute) 05/11/2019 0.20  0.00 - 0.51 K/uL Final   • Baso (Absolute) 05/11/2019 0.03  0.00 - 0.12 K/uL Final   • Immature Granulocytes (abs)  05/11/2019 0.02  0.00 - 0.11 K/uL Final   • NRBC (Absolute) 05/11/2019 0.00  K/uL Final   • Fasting Status 05/11/2019 Fasting   Final   • GFR If  05/11/2019 >60  >60 mL/min/1.73 m 2 Final   • GFR If Non  05/11/2019 >60  >60 mL/min/1.73 m 2 Final             Assessment/Plan:     1. Essential hypertension  Blood pressure remains elevated in the office today at 150/80 at the initial encounter at similar upon recheck at 140/90. This is similar to his last visit as well. We will increase his Amlodipine dosage again to 10 mg. Advised to look for peripheral edema. He will check his blood pressure daily before going into work, and bring in a record of his levels. He will also bring in his machine to his next visit for calibration.  We will follow-up with him in 3 months.  - amLODIPine (NORVASC) 10 MG Tab; Take 1 Tab by mouth every day.  Dispense: 90 Tab; Refill: 1    2. Dyslipidemia  Triglyceride levels have returned to normal dropping from 153 to 72. All other values are also within normal limits.  He will continue to manage this with his own efforts. I have advised the patient to increase his exercise regimen and avoid fatty foods. We will recheck with future lab tests.       Nikhil VALLECILLO (Scribe), am scribing for, and in the presence of, Allyssa Cortez MD     Electronically signed by: Nikhil Ferreira (Johnnyibe), 5/20/2019    IAllyssa MD personally performed the services described in this documentation, as scribed by Nikhil Ferreira in my presence, and it is both accurate and complete.

## 2019-08-26 ENCOUNTER — OFFICE VISIT (OUTPATIENT)
Dept: MEDICAL GROUP | Facility: PHYSICIAN GROUP | Age: 44
End: 2019-08-26
Payer: COMMERCIAL

## 2019-08-26 VITALS
HEART RATE: 71 BPM | TEMPERATURE: 97.4 F | BODY MASS INDEX: 26.14 KG/M2 | DIASTOLIC BLOOD PRESSURE: 60 MMHG | SYSTOLIC BLOOD PRESSURE: 126 MMHG | HEIGHT: 71 IN | OXYGEN SATURATION: 97 % | WEIGHT: 186.73 LBS

## 2019-08-26 DIAGNOSIS — I10 ESSENTIAL HYPERTENSION: ICD-10-CM

## 2019-08-26 PROCEDURE — 99212 OFFICE O/P EST SF 10 MIN: CPT | Performed by: FAMILY MEDICINE

## 2019-08-26 NOTE — PROGRESS NOTES
"Subjective:      Bertrand Platt is a 44 y.o. male who presents with Hypertension and Medication Refill (amlodipine)    HPI:    The patient is here for follow up on his hypertension.    Essential Hypertension  On his last visit, his blood pressure was still not well controlled at 150/60. We increased his Amlodipine dosage from 5 mg to 10 mg for his hypertension, and he has been compliant with this. He has not been having any side effects including peripheral edema. He monitored his blood pressure at home for the first month, and they were about 120-130/80 at home. His wife brought his machine into the office for calibration, and it was correct. Blood pressure in the office today is within goal at 126/60.      Past medical history, past surgical history, family history reviewed-no changes    Social history reviewed-no changes    Allergies reviewed-no changes    Medications reviewed-no changes      ROS:  As per the HPI as shown above, the rest are negative.       Objective:     /60 (BP Location: Left arm, Patient Position: Sitting, BP Cuff Size: Adult)   Pulse 71   Temp 36.3 °C (97.4 °F) (Temporal)   Ht 1.803 m (5' 11\")   Wt 84.7 kg (186 lb 11.7 oz)   SpO2 97%   BMI 26.04 kg/m²     Physical Exam    Examined alert, awake, oriented, not in distress    Neck-supple, no lymphadenopathy, no thyromegaly  Lungs-clear to auscultation, no rales, no wheezes  Heart-regular rate and rhythm, no murmur  Extremities-no edema, clubbing, cyanosis       Assessment/Plan:     1. Essential Hypertension  We previously increased his Amlodipine dosage from 5 mg to 10 mg. His blood pressure is now well-controlled at 126/60. We will continue the current dosage. I advised he monitor his levels at home, exercise frequently, and avoid salty foods.         INikhil (Scribe), am scribing for, and in the presence of, Allyssa Cortez MD     Electronically signed by: Nikhil Ferreira (Scribe), 8/26/2019    Allyssa VALLECILLO MD " personally performed the services described in this documentation, as scribed by Nikhil Ferreira in my presence, and it is both accurate and complete.

## 2019-12-01 DIAGNOSIS — I10 ESSENTIAL HYPERTENSION: ICD-10-CM

## 2019-12-02 RX ORDER — AMLODIPINE BESYLATE 10 MG/1
TABLET ORAL
Qty: 90 TAB | Refills: 1 | Status: SHIPPED | OUTPATIENT
Start: 2019-12-02 | End: 2020-06-15

## 2020-03-02 ENCOUNTER — APPOINTMENT (OUTPATIENT)
Dept: MEDICAL GROUP | Facility: PHYSICIAN GROUP | Age: 45
End: 2020-03-02
Payer: COMMERCIAL

## 2020-03-30 ENCOUNTER — OFFICE VISIT (OUTPATIENT)
Dept: MEDICAL GROUP | Facility: PHYSICIAN GROUP | Age: 45
End: 2020-03-30
Payer: COMMERCIAL

## 2020-03-30 VITALS
WEIGHT: 182.76 LBS | BODY MASS INDEX: 25.59 KG/M2 | HEART RATE: 73 BPM | OXYGEN SATURATION: 98 % | HEIGHT: 71 IN | SYSTOLIC BLOOD PRESSURE: 128 MMHG | TEMPERATURE: 97.8 F | DIASTOLIC BLOOD PRESSURE: 80 MMHG

## 2020-03-30 DIAGNOSIS — Z23 NEED FOR IMMUNIZATION AGAINST INFLUENZA: ICD-10-CM

## 2020-03-30 DIAGNOSIS — I10 ESSENTIAL HYPERTENSION: ICD-10-CM

## 2020-03-30 DIAGNOSIS — E78.5 DYSLIPIDEMIA: ICD-10-CM

## 2020-03-30 PROCEDURE — 90686 IIV4 VACC NO PRSV 0.5 ML IM: CPT | Performed by: FAMILY MEDICINE

## 2020-03-30 PROCEDURE — 99213 OFFICE O/P EST LOW 20 MIN: CPT | Mod: 25 | Performed by: FAMILY MEDICINE

## 2020-03-30 PROCEDURE — 90471 IMMUNIZATION ADMIN: CPT | Performed by: FAMILY MEDICINE

## 2020-03-30 ASSESSMENT — FIBROSIS 4 INDEX: FIB4 SCORE: 0.95

## 2020-03-30 ASSESSMENT — PATIENT HEALTH QUESTIONNAIRE - PHQ9: CLINICAL INTERPRETATION OF PHQ2 SCORE: 0

## 2020-03-30 NOTE — PROGRESS NOTES
"Subjective:      Bertrand Platt is a 45 y.o. male who presents with Hypertension        HPI:    The patient is here for follow up on his hypertension and dyslipidemia. The patient did not complete his blood work prior to this visit due to insurance cost. He notes that insurance will only cover yearly blood work.    Essential Hypertension  He takes amlodipine 10 mg for his hypertension without any side effects. During his last office visit in 8/2019, his blood pressure was well controlled at 126/60 with the increased amlodipine dose. His blood pressure machine was calibrated at the time and was correct. He has not been monitoring his blood pressure at home. He has recently increased his exercise. Blood pressure in the office is at 128/80.       Dyslipidemia  He continues to manage his dyslipidemia through his own efforts. His last blood work in 5/2019 showed all lipid values within normal limits. Since his last office visit, he has lost approximately 4 pounds. He has increased his exercise and recently cut out coffee and does not eat after work.       Past medical history, past surgical history, family history reviewed-no changes    Social history reviewed-no changes    Allergies reviewed-no changes    Medications reviewed-no changes     ROS:  As per the HPI as shown above, the rest are negative.       Objective:     /80 (BP Location: Left arm, Patient Position: Sitting, BP Cuff Size: Adult)   Pulse 73   Temp 36.6 °C (97.8 °F) (Temporal)   Ht 1.803 m (5' 11\")   Wt 82.9 kg (182 lb 12.2 oz)   SpO2 98%   BMI 25.49 kg/m²     Physical Exam  Examined alert, awake, oriented, not in distress    Neck-supple, no lymphadenopathy, no thyromegaly  Lungs-clear to auscultation, no rales, no wheezes  Heart-regular rate and rhythm, no murmur  Extremities-no edema, clubbing, cyanosis      Assessment/Plan:     1. Essential hypertension  Blood pressure under control. We will continue amlodipine 10 mg daily.. He is " encouraged to monitor his blood pressure at home with his machine. I have advised him to avoid salty foods and exercise regularly.   - Lipid Profile; Future  - Comp Metabolic Panel; Future  - CBC WITH DIFFERENTIAL; Future    2. Dyslipidemia  Last blood work in 5/2019 showed all lipid panel values within goal. He will continue to manage this through his own efforts. He is encouraged to continue his exercise regimen. Labs have been ordered for the next follow up visit.    - Lipid Profile; Future  - Comp Metabolic Panel; Future  - CBC WITH DIFFERENTIAL; Future    3. Need for immunization against influenza  Patient agrees to vaccination today. Vaccine was administered today without adverse event.   - Influenza Vaccine Quad Injection (PF)        Herman VALLECILLO (Scribe), am scribing for, and in the presence of, Allyssa Cortez MD     Electronically signed by: Herman Gallardo (Scribe), 3/30/2020    Allyssa VALLECILLO MD personally performed the services described in this documentation, as scribed by Herman Gallardo in my presence, and it is both accurate and complete.

## 2020-06-13 DIAGNOSIS — I10 ESSENTIAL HYPERTENSION: ICD-10-CM

## 2020-06-15 RX ORDER — AMLODIPINE BESYLATE 10 MG/1
TABLET ORAL
Qty: 90 TAB | Refills: 0 | Status: SHIPPED | OUTPATIENT
Start: 2020-06-15 | End: 2020-09-08

## 2020-09-06 DIAGNOSIS — I10 ESSENTIAL HYPERTENSION: ICD-10-CM

## 2020-09-08 RX ORDER — AMLODIPINE BESYLATE 10 MG/1
TABLET ORAL
Qty: 90 TAB | Refills: 1 | Status: SHIPPED | OUTPATIENT
Start: 2020-09-08 | End: 2020-09-13 | Stop reason: SDUPTHER

## 2020-09-21 ENCOUNTER — HOSPITAL ENCOUNTER (OUTPATIENT)
Dept: LAB | Facility: MEDICAL CENTER | Age: 45
End: 2020-09-21
Attending: FAMILY MEDICINE
Payer: COMMERCIAL

## 2020-09-21 DIAGNOSIS — I10 ESSENTIAL HYPERTENSION: ICD-10-CM

## 2020-09-21 DIAGNOSIS — E78.5 DYSLIPIDEMIA: ICD-10-CM

## 2020-09-21 LAB
ALBUMIN SERPL BCP-MCNC: 4.5 G/DL (ref 3.2–4.9)
ALBUMIN/GLOB SERPL: 1.7 G/DL
ALP SERPL-CCNC: 52 U/L (ref 30–99)
ALT SERPL-CCNC: 40 U/L (ref 2–50)
ANION GAP SERPL CALC-SCNC: 14 MMOL/L (ref 7–16)
AST SERPL-CCNC: 31 U/L (ref 12–45)
BASOPHILS # BLD AUTO: 0.5 % (ref 0–1.8)
BASOPHILS # BLD: 0.03 K/UL (ref 0–0.12)
BILIRUB SERPL-MCNC: 0.4 MG/DL (ref 0.1–1.5)
BUN SERPL-MCNC: 21 MG/DL (ref 8–22)
CALCIUM SERPL-MCNC: 9.1 MG/DL (ref 8.5–10.5)
CHLORIDE SERPL-SCNC: 103 MMOL/L (ref 96–112)
CHOLEST SERPL-MCNC: 132 MG/DL (ref 100–199)
CO2 SERPL-SCNC: 22 MMOL/L (ref 20–33)
CREAT SERPL-MCNC: 0.92 MG/DL (ref 0.5–1.4)
EOSINOPHIL # BLD AUTO: 0.05 K/UL (ref 0–0.51)
EOSINOPHIL NFR BLD: 0.9 % (ref 0–6.9)
ERYTHROCYTE [DISTWIDTH] IN BLOOD BY AUTOMATED COUNT: 42.5 FL (ref 35.9–50)
FASTING STATUS PATIENT QL REPORTED: NORMAL
GLOBULIN SER CALC-MCNC: 2.6 G/DL (ref 1.9–3.5)
GLUCOSE SERPL-MCNC: 96 MG/DL (ref 65–99)
HCT VFR BLD AUTO: 45.5 % (ref 42–52)
HDLC SERPL-MCNC: 47 MG/DL
HGB BLD-MCNC: 14.9 G/DL (ref 14–18)
IMM GRANULOCYTES # BLD AUTO: 0.02 K/UL (ref 0–0.11)
IMM GRANULOCYTES NFR BLD AUTO: 0.4 % (ref 0–0.9)
LDLC SERPL CALC-MCNC: 77 MG/DL
LYMPHOCYTES # BLD AUTO: 2.04 K/UL (ref 1–4.8)
LYMPHOCYTES NFR BLD: 36.6 % (ref 22–41)
MCH RBC QN AUTO: 29.3 PG (ref 27–33)
MCHC RBC AUTO-ENTMCNC: 32.7 G/DL (ref 33.7–35.3)
MCV RBC AUTO: 89.4 FL (ref 81.4–97.8)
MONOCYTES # BLD AUTO: 0.45 K/UL (ref 0–0.85)
MONOCYTES NFR BLD AUTO: 8.1 % (ref 0–13.4)
NEUTROPHILS # BLD AUTO: 2.99 K/UL (ref 1.82–7.42)
NEUTROPHILS NFR BLD: 53.5 % (ref 44–72)
NRBC # BLD AUTO: 0 K/UL
NRBC BLD-RTO: 0 /100 WBC
PLATELET # BLD AUTO: 262 K/UL (ref 164–446)
PMV BLD AUTO: 9.5 FL (ref 9–12.9)
POTASSIUM SERPL-SCNC: 3.8 MMOL/L (ref 3.6–5.5)
PROT SERPL-MCNC: 7.1 G/DL (ref 6–8.2)
RBC # BLD AUTO: 5.09 M/UL (ref 4.7–6.1)
SODIUM SERPL-SCNC: 139 MMOL/L (ref 135–145)
TRIGL SERPL-MCNC: 40 MG/DL (ref 0–149)
WBC # BLD AUTO: 5.6 K/UL (ref 4.8–10.8)

## 2020-09-21 PROCEDURE — 80061 LIPID PANEL: CPT

## 2020-09-21 PROCEDURE — 85025 COMPLETE CBC W/AUTO DIFF WBC: CPT

## 2020-09-21 PROCEDURE — 80053 COMPREHEN METABOLIC PANEL: CPT

## 2020-09-21 PROCEDURE — 36415 COLL VENOUS BLD VENIPUNCTURE: CPT

## 2020-09-28 ENCOUNTER — OFFICE VISIT (OUTPATIENT)
Dept: MEDICAL GROUP | Facility: PHYSICIAN GROUP | Age: 45
End: 2020-09-28
Payer: COMMERCIAL

## 2020-09-28 VITALS
HEART RATE: 71 BPM | DIASTOLIC BLOOD PRESSURE: 70 MMHG | OXYGEN SATURATION: 98 % | SYSTOLIC BLOOD PRESSURE: 130 MMHG | BODY MASS INDEX: 25.15 KG/M2 | WEIGHT: 179.68 LBS | HEIGHT: 71 IN | TEMPERATURE: 98 F

## 2020-09-28 DIAGNOSIS — E78.5 DYSLIPIDEMIA: ICD-10-CM

## 2020-09-28 DIAGNOSIS — I10 ESSENTIAL HYPERTENSION: ICD-10-CM

## 2020-09-28 PROCEDURE — 99213 OFFICE O/P EST LOW 20 MIN: CPT | Performed by: FAMILY MEDICINE

## 2020-09-28 ASSESSMENT — FIBROSIS 4 INDEX: FIB4 SCORE: 0.86

## 2020-09-28 NOTE — PROGRESS NOTES
"Subjective:      Bertrand Platt is a 45 y.o. male who presents with No chief complaint on file.            HPI     Patient returns for follow-up of hypertension and dyslipidemia.    In terms of the hypertension, he continues to take amlodipine 10 mg daily without side effects with good control of the blood pressure.    He continues to manage his dyslipidemia with his own efforts only.  Blood work was done before this visit.    He needs a flu shot but this is not available in the office today and so he will get it from a local pharmacy.    Past medical history, past surgical history, family history reviewed-no changes    Social history reviewed-no changes    Allergies reviewed-no changes    Medications reviewed-no changes    ROS     As per HPI, the rest are negative.       Objective:     /70 (BP Location: Left arm, Patient Position: Sitting, BP Cuff Size: Adult)   Pulse 71   Temp 36.7 °C (98 °F) (Temporal)   Ht 1.803 m (5' 11\")   Wt 81.5 kg (179 lb 10.8 oz)   SpO2 98%   BMI 25.06 kg/m²      Physical Exam     Examined alert, awake, oriented, not in distress    Neck-supple, no lymphadenopathy, no thyromegaly  Lungs-clear to auscultation, no rales, no wheezes  Heart-regular rate and rhythm, no murmur  Extremities-no edema, clubbing, cyanosis       Hospital Outpatient Visit on 09/21/2020   Component Date Value Ref Range Status   • WBC 09/21/2020 5.6  4.8 - 10.8 K/uL Final   • RBC 09/21/2020 5.09  4.70 - 6.10 M/uL Final   • Hemoglobin 09/21/2020 14.9  14.0 - 18.0 g/dL Final   • Hematocrit 09/21/2020 45.5  42.0 - 52.0 % Final   • MCV 09/21/2020 89.4  81.4 - 97.8 fL Final   • MCH 09/21/2020 29.3  27.0 - 33.0 pg Final   • MCHC 09/21/2020 32.7* 33.7 - 35.3 g/dL Final   • RDW 09/21/2020 42.5  35.9 - 50.0 fL Final   • Platelet Count 09/21/2020 262  164 - 446 K/uL Final   • MPV 09/21/2020 9.5  9.0 - 12.9 fL Final   • Neutrophils-Polys 09/21/2020 53.50  44.00 - 72.00 % Final   • Lymphocytes 09/21/2020 36.60  22.00 - " 41.00 % Final   • Monocytes 09/21/2020 8.10  0.00 - 13.40 % Final   • Eosinophils 09/21/2020 0.90  0.00 - 6.90 % Final   • Basophils 09/21/2020 0.50  0.00 - 1.80 % Final   • Immature Granulocytes 09/21/2020 0.40  0.00 - 0.90 % Final   • Nucleated RBC 09/21/2020 0.00  /100 WBC Final   • Neutrophils (Absolute) 09/21/2020 2.99  1.82 - 7.42 K/uL Final    Includes immature neutrophils, if present.   • Lymphs (Absolute) 09/21/2020 2.04  1.00 - 4.80 K/uL Final   • Monos (Absolute) 09/21/2020 0.45  0.00 - 0.85 K/uL Final   • Eos (Absolute) 09/21/2020 0.05  0.00 - 0.51 K/uL Final   • Baso (Absolute) 09/21/2020 0.03  0.00 - 0.12 K/uL Final   • Immature Granulocytes (abs) 09/21/2020 0.02  0.00 - 0.11 K/uL Final   • NRBC (Absolute) 09/21/2020 0.00  K/uL Final   • Sodium 09/21/2020 139  135 - 145 mmol/L Final   • Potassium 09/21/2020 3.8  3.6 - 5.5 mmol/L Final   • Chloride 09/21/2020 103  96 - 112 mmol/L Final   • Co2 09/21/2020 22  20 - 33 mmol/L Final   • Anion Gap 09/21/2020 14.0  7.0 - 16.0 Final   • Glucose 09/21/2020 96  65 - 99 mg/dL Final   • Bun 09/21/2020 21  8 - 22 mg/dL Final   • Creatinine 09/21/2020 0.92  0.50 - 1.40 mg/dL Final   • Calcium 09/21/2020 9.1  8.5 - 10.5 mg/dL Final   • AST(SGOT) 09/21/2020 31  12 - 45 U/L Final   • ALT(SGPT) 09/21/2020 40  2 - 50 U/L Final   • Alkaline Phosphatase 09/21/2020 52  30 - 99 U/L Final   • Total Bilirubin 09/21/2020 0.4  0.1 - 1.5 mg/dL Final   • Albumin 09/21/2020 4.5  3.2 - 4.9 g/dL Final   • Total Protein 09/21/2020 7.1  6.0 - 8.2 g/dL Final   • Globulin 09/21/2020 2.6  1.9 - 3.5 g/dL Final   • A-G Ratio 09/21/2020 1.7  g/dL Final   • Cholesterol,Tot 09/21/2020 132  100 - 199 mg/dL Final   • Triglycerides 09/21/2020 40  0 - 149 mg/dL Final   • HDL 09/21/2020 47  >=40 mg/dL Final   • LDL 09/21/2020 77  <100 mg/dL Final   • Fasting Status 09/21/2020 Fasting   Final   • GFR If  09/21/2020 >60  >60 mL/min/1.73 m 2 Final   • GFR If Non   09/21/2020 >60  >60 mL/min/1.73 m 2 Final          Assessment/Plan:        1. Essential hypertension  Controlled on amlodipine 10 mg daily and we will keep him on the same dose of medication.    2. Dyslipidemia  All at target with his own efforts only.  He will continue to manage this with his own efforts.    Follow-up in 6 months.      Please note that this dictation was created using voice recognition software. I have worked with consultants from the vendor as well as technical experts from [employer]  [email or web address] to optimize the interface. I have made every reasonable attempt to correct obvious errors, but I expect that there are errors of grammar and possibly content I did not discover before finalizing the note.

## 2021-03-29 ENCOUNTER — OFFICE VISIT (OUTPATIENT)
Dept: MEDICAL GROUP | Facility: PHYSICIAN GROUP | Age: 46
End: 2021-03-29
Payer: COMMERCIAL

## 2021-03-29 VITALS
WEIGHT: 186.07 LBS | BODY MASS INDEX: 26.05 KG/M2 | HEART RATE: 77 BPM | OXYGEN SATURATION: 97 % | TEMPERATURE: 97.5 F | SYSTOLIC BLOOD PRESSURE: 130 MMHG | DIASTOLIC BLOOD PRESSURE: 78 MMHG | HEIGHT: 71 IN

## 2021-03-29 DIAGNOSIS — E78.5 DYSLIPIDEMIA: ICD-10-CM

## 2021-03-29 DIAGNOSIS — I10 ESSENTIAL HYPERTENSION: ICD-10-CM

## 2021-03-29 PROCEDURE — 99213 OFFICE O/P EST LOW 20 MIN: CPT | Performed by: FAMILY MEDICINE

## 2021-03-29 RX ORDER — AMLODIPINE BESYLATE 10 MG/1
10 TABLET ORAL
Qty: 90 TABLET | Refills: 1 | Status: SHIPPED | OUTPATIENT
Start: 2021-03-29 | End: 2021-08-12 | Stop reason: SDUPTHER

## 2021-03-29 ASSESSMENT — FIBROSIS 4 INDEX: FIB4 SCORE: 0.88

## 2021-03-29 ASSESSMENT — PATIENT HEALTH QUESTIONNAIRE - PHQ9: CLINICAL INTERPRETATION OF PHQ2 SCORE: 0

## 2021-03-29 NOTE — PROGRESS NOTES
"Subjective:      Bertrand Platt is a 46 y.o. male who presents with Hypertension            HPI     Patient returns for follow-up of his medical problems.    In terms of his hypertension, he continues to take amlodipine 10 mg 1 tablet daily but he has not been checking his blood pressure at home since the last visit.  Blood pressure is under acceptable control.  Denies any side effects from the medication.    We follow him for dyslipidemia and he has been able to get this to goal with his own efforts only.  His last blood work was in September 2020 and came back all at goal.    He got his first dose of COVID-19 shot 2 weeks ago.    Past medical history, past surgical history, family history reviewed-no changes    Social history reviewed-no changes    Allergies reviewed-no changes    Medications reviewed-no changes    ROS     As per HPI, the rest are negative.     Objective:     /78   Pulse 77   Temp 36.4 °C (97.5 °F) (Temporal)   Ht 1.803 m (5' 11\")   Wt 84.4 kg (186 lb 1.1 oz)   SpO2 97%   BMI 25.95 kg/m²      Physical Exam     Examined alert, awake, oriented, not in distress    Neck-supple, no lymphadenopathy, no thyromegaly  Lungs-clear to auscultation, no rales, no wheezes  Heart-regular rate and rhythm, no murmur  Extremities-no edema, clubbing, cyanosis            Assessment/Plan:        1. Essential hypertension   Controlled on amlodipine.  Continue medication.  - Lipid Profile; Future  - Comp Metabolic Panel; Future  - CBC WITH DIFFERENTIAL; Future  - amLODIPine (NORVASC) 10 MG Tab; Take 1 tablet by mouth every day.  Dispense: 90 tablet; Refill: 1    2. Dyslipidemia  He was able to get this at goal with his own efforts only.  He will continue to manage this on his own.  We will do updated blood work next visit.  - Lipid Profile; Future  - Comp Metabolic Panel; Future  - CBC WITH DIFFERENTIAL; Future    He will get his second dose of the COVID-19 shot as scheduled.  We will give him his flu shot " when he returns in 6 months.      Please note that this dictation was created using voice recognition software. I have worked with consultants from the vendor as well as technical experts from [facility] to optimize the interface. I have made every reasonable attempt to correct obvious errors, but I expect that there are errors of grammar and possibly content I did not discover before finalizing the note.

## 2021-09-18 ENCOUNTER — HOSPITAL ENCOUNTER (OUTPATIENT)
Dept: LAB | Facility: MEDICAL CENTER | Age: 46
End: 2021-09-18
Attending: FAMILY MEDICINE
Payer: COMMERCIAL

## 2021-09-18 DIAGNOSIS — I10 ESSENTIAL HYPERTENSION: ICD-10-CM

## 2021-09-18 DIAGNOSIS — E78.5 DYSLIPIDEMIA: ICD-10-CM

## 2021-09-18 LAB
ALBUMIN SERPL BCP-MCNC: 4.5 G/DL (ref 3.2–4.9)
ALBUMIN/GLOB SERPL: 1.6 G/DL
ALP SERPL-CCNC: 58 U/L (ref 30–99)
ALT SERPL-CCNC: 34 U/L (ref 2–50)
ANION GAP SERPL CALC-SCNC: 13 MMOL/L (ref 7–16)
AST SERPL-CCNC: 28 U/L (ref 12–45)
BASOPHILS # BLD AUTO: 0.4 % (ref 0–1.8)
BASOPHILS # BLD: 0.03 K/UL (ref 0–0.12)
BILIRUB SERPL-MCNC: 0.8 MG/DL (ref 0.1–1.5)
BUN SERPL-MCNC: 19 MG/DL (ref 8–22)
CALCIUM SERPL-MCNC: 9.5 MG/DL (ref 8.5–10.5)
CHLORIDE SERPL-SCNC: 101 MMOL/L (ref 96–112)
CHOLEST SERPL-MCNC: 180 MG/DL (ref 100–199)
CO2 SERPL-SCNC: 26 MMOL/L (ref 20–33)
CREAT SERPL-MCNC: 0.96 MG/DL (ref 0.5–1.4)
EOSINOPHIL # BLD AUTO: 0.12 K/UL (ref 0–0.51)
EOSINOPHIL NFR BLD: 1.8 % (ref 0–6.9)
ERYTHROCYTE [DISTWIDTH] IN BLOOD BY AUTOMATED COUNT: 41.4 FL (ref 35.9–50)
GLOBULIN SER CALC-MCNC: 2.9 G/DL (ref 1.9–3.5)
GLUCOSE SERPL-MCNC: 94 MG/DL (ref 65–99)
HCT VFR BLD AUTO: 47.3 % (ref 42–52)
HDLC SERPL-MCNC: 52 MG/DL
HGB BLD-MCNC: 15.8 G/DL (ref 14–18)
IMM GRANULOCYTES # BLD AUTO: 0.01 K/UL (ref 0–0.11)
IMM GRANULOCYTES NFR BLD AUTO: 0.1 % (ref 0–0.9)
LDLC SERPL CALC-MCNC: 113 MG/DL
LYMPHOCYTES # BLD AUTO: 2.72 K/UL (ref 1–4.8)
LYMPHOCYTES NFR BLD: 39.9 % (ref 22–41)
MCH RBC QN AUTO: 29.3 PG (ref 27–33)
MCHC RBC AUTO-ENTMCNC: 33.4 G/DL (ref 33.7–35.3)
MCV RBC AUTO: 87.8 FL (ref 81.4–97.8)
MONOCYTES # BLD AUTO: 0.49 K/UL (ref 0–0.85)
MONOCYTES NFR BLD AUTO: 7.2 % (ref 0–13.4)
NEUTROPHILS # BLD AUTO: 3.44 K/UL (ref 1.82–7.42)
NEUTROPHILS NFR BLD: 50.6 % (ref 44–72)
NRBC # BLD AUTO: 0 K/UL
NRBC BLD-RTO: 0 /100 WBC
PLATELET # BLD AUTO: 270 K/UL (ref 164–446)
PMV BLD AUTO: 9.4 FL (ref 9–12.9)
POTASSIUM SERPL-SCNC: 3.6 MMOL/L (ref 3.6–5.5)
PROT SERPL-MCNC: 7.4 G/DL (ref 6–8.2)
RBC # BLD AUTO: 5.39 M/UL (ref 4.7–6.1)
SODIUM SERPL-SCNC: 140 MMOL/L (ref 135–145)
TRIGL SERPL-MCNC: 76 MG/DL (ref 0–149)
WBC # BLD AUTO: 6.8 K/UL (ref 4.8–10.8)

## 2021-09-18 PROCEDURE — 85025 COMPLETE CBC W/AUTO DIFF WBC: CPT

## 2021-09-18 PROCEDURE — 36415 COLL VENOUS BLD VENIPUNCTURE: CPT

## 2021-09-18 PROCEDURE — 80053 COMPREHEN METABOLIC PANEL: CPT

## 2021-09-18 PROCEDURE — 80061 LIPID PANEL: CPT

## 2021-09-27 ENCOUNTER — OFFICE VISIT (OUTPATIENT)
Dept: MEDICAL GROUP | Facility: PHYSICIAN GROUP | Age: 46
End: 2021-09-27
Payer: COMMERCIAL

## 2021-09-27 VITALS
WEIGHT: 183.86 LBS | HEIGHT: 71 IN | SYSTOLIC BLOOD PRESSURE: 120 MMHG | BODY MASS INDEX: 25.74 KG/M2 | TEMPERATURE: 98 F | DIASTOLIC BLOOD PRESSURE: 70 MMHG | OXYGEN SATURATION: 97 % | HEART RATE: 68 BPM

## 2021-09-27 DIAGNOSIS — Z23 NEED FOR IMMUNIZATION AGAINST INFLUENZA: ICD-10-CM

## 2021-09-27 DIAGNOSIS — I10 ESSENTIAL HYPERTENSION: ICD-10-CM

## 2021-09-27 DIAGNOSIS — E78.5 DYSLIPIDEMIA: ICD-10-CM

## 2021-09-27 PROCEDURE — 90471 IMMUNIZATION ADMIN: CPT | Performed by: FAMILY MEDICINE

## 2021-09-27 PROCEDURE — 99213 OFFICE O/P EST LOW 20 MIN: CPT | Mod: 25 | Performed by: FAMILY MEDICINE

## 2021-09-27 PROCEDURE — 90686 IIV4 VACC NO PRSV 0.5 ML IM: CPT | Performed by: FAMILY MEDICINE

## 2021-09-27 ASSESSMENT — FIBROSIS 4 INDEX: FIB4 SCORE: 0.84

## 2021-09-27 NOTE — PROGRESS NOTES
"Subjective     Bertrand Platt is a 46 y.o. male who presents with Hypertension            HPI     Patient returns for follow-up of his medical problems.    We follow him for hypertension and he is to take amlodipine 10 mg daily with good control of his blood pressure.  Denies any side effects from the medication.    For the dyslipidemia, he continues to manage this with his own efforts.  His LDL has been at goal since 2017.  Lately he said he has been eating yogurt and string cheese.    He completed 2 doses of COVID-19 shot but we only have the documentation of the first dose.  He will send me the picture of his immunization card.  He needs a flu shot today.    Past medical history, past surgical history, family history reviewed-no changes    Social history reviewed-no changes    Allergies reviewed-no changes    Medications reviewed-no changes    ROS     As per HPI, the rest are negative.           Objective     /70 (BP Location: Left arm, Patient Position: Sitting, BP Cuff Size: Adult)   Pulse 68   Temp 36.7 °C (98 °F) (Temporal)   Ht 1.803 m (5' 11\")   Wt 83.4 kg (183 lb 13.8 oz)   SpO2 97%   BMI 25.64 kg/m²      Physical Exam     Examined alert, awake, oriented, not in distress    Neck-supple, no lymphadenopathy, no thyromegaly  Lungs-clear to auscultation, no rales, no wheezes  Heart-regular rate and rhythm, no murmur  Extremities-no edema, clubbing, cyanosis           Hospital Outpatient Visit on 09/18/2021   Component Date Value Ref Range Status   • WBC 09/18/2021 6.8  4.8 - 10.8 K/uL Final   • RBC 09/18/2021 5.39  4.70 - 6.10 M/uL Final   • Hemoglobin 09/18/2021 15.8  14.0 - 18.0 g/dL Final   • Hematocrit 09/18/2021 47.3  42.0 - 52.0 % Final   • MCV 09/18/2021 87.8  81.4 - 97.8 fL Final   • MCH 09/18/2021 29.3  27.0 - 33.0 pg Final   • MCHC 09/18/2021 33.4* 33.7 - 35.3 g/dL Final   • RDW 09/18/2021 41.4  35.9 - 50.0 fL Final   • Platelet Count 09/18/2021 270  164 - 446 K/uL Final   • MPV " 09/18/2021 9.4  9.0 - 12.9 fL Final   • Neutrophils-Polys 09/18/2021 50.60  44.00 - 72.00 % Final   • Lymphocytes 09/18/2021 39.90  22.00 - 41.00 % Final   • Monocytes 09/18/2021 7.20  0.00 - 13.40 % Final   • Eosinophils 09/18/2021 1.80  0.00 - 6.90 % Final   • Basophils 09/18/2021 0.40  0.00 - 1.80 % Final   • Immature Granulocytes 09/18/2021 0.10  0.00 - 0.90 % Final   • Nucleated RBC 09/18/2021 0.00  /100 WBC Final   • Neutrophils (Absolute) 09/18/2021 3.44  1.82 - 7.42 K/uL Final    Includes immature neutrophils, if present.   • Lymphs (Absolute) 09/18/2021 2.72  1.00 - 4.80 K/uL Final   • Monos (Absolute) 09/18/2021 0.49  0.00 - 0.85 K/uL Final   • Eos (Absolute) 09/18/2021 0.12  0.00 - 0.51 K/uL Final   • Baso (Absolute) 09/18/2021 0.03  0.00 - 0.12 K/uL Final   • Immature Granulocytes (abs) 09/18/2021 0.01  0.00 - 0.11 K/uL Final   • NRBC (Absolute) 09/18/2021 0.00  K/uL Final   • Sodium 09/18/2021 140  135 - 145 mmol/L Final   • Potassium 09/18/2021 3.6  3.6 - 5.5 mmol/L Final   • Chloride 09/18/2021 101  96 - 112 mmol/L Final   • Co2 09/18/2021 26  20 - 33 mmol/L Final   • Anion Gap 09/18/2021 13.0  7.0 - 16.0 Final   • Glucose 09/18/2021 94  65 - 99 mg/dL Final   • Bun 09/18/2021 19  8 - 22 mg/dL Final   • Creatinine 09/18/2021 0.96  0.50 - 1.40 mg/dL Final   • Calcium 09/18/2021 9.5  8.5 - 10.5 mg/dL Final   • AST(SGOT) 09/18/2021 28  12 - 45 U/L Final   • ALT(SGPT) 09/18/2021 34  2 - 50 U/L Final   • Alkaline Phosphatase 09/18/2021 58  30 - 99 U/L Final   • Total Bilirubin 09/18/2021 0.8  0.1 - 1.5 mg/dL Final   • Albumin 09/18/2021 4.5  3.2 - 4.9 g/dL Final   • Total Protein 09/18/2021 7.4  6.0 - 8.2 g/dL Final   • Globulin 09/18/2021 2.9  1.9 - 3.5 g/dL Final   • A-G Ratio 09/18/2021 1.6  g/dL Final   • Cholesterol,Tot 09/18/2021 180  100 - 199 mg/dL Final   • Triglycerides 09/18/2021 76  0 - 149 mg/dL Final   • HDL 09/18/2021 52  >=40 mg/dL Final   • LDL 09/18/2021 113* <100 mg/dL Final   • GFR If   09/18/2021 >60  >60 mL/min/1.73 m 2 Final   • GFR If Non  09/18/2021 >60  >60 mL/min/1.73 m 2 Final              The 10-year ASCVD risk score (Briandavian BORDEN Jr., et al., 2013) is: 1.8%    Assessment & Plan        1. Essential hypertension  Under control on amlodipine 10 mg daily.  Continue medication.  Follow-up in 6 months.    2. Dyslipidemia  LDL is now slightly high above 100.  We discussed the diet he needs to follow.  He was given a diet sheet.  Discussed regular exercises and keeping a healthy weight.  He wants to continue doing the blood work only on a yearly basis.    3. Need for immunization against influenza   Flu shot was given on this visit.      Please note that this dictation was created using voice recognition software. I have worked with consultants from the vendor as well as technical experts from [facility] to optimize the interface. I have made every reasonable attempt to correct obvious errors, but I expect that there are errors of grammar and possibly content I did not discover before finalizing the note.

## 2021-09-27 NOTE — PATIENT INSTRUCTIONS
Cholesterol  Cholesterol is a white, waxy, fat-like substance needed by your body in small amounts. The liver makes all the cholesterol you need. Cholesterol is carried from the liver by the blood through the blood vessels. Deposits of cholesterol (plaque) may build up on blood vessel walls. These make the arteries narrower and stiffer. Cholesterol plaques increase the risk for heart attack and stroke.   You cannot feel your cholesterol level even if it is very high. The only way to know it is high is with a blood test. Once you know your cholesterol levels, you should keep a record of the test results. Work with your health care provider to keep your levels in the desired range.   WHAT DO THE RESULTS MEAN?  · Total cholesterol is a rough measure of all the cholesterol in your blood.    · LDL is the so-called bad cholesterol. This is the type that deposits cholesterol in the walls of the arteries. You want this level to be low.    · HDL is the good cholesterol because it cleans the arteries and carries the LDL away. You want this level to be high.  · Triglycerides are fat that the body can either burn for energy or store. High levels are closely linked to heart disease.    WHAT ARE THE DESIRED LEVELS OF CHOLESTEROL?  · Total cholesterol below 200.    · LDL below 100 for people at risk, below 70 for those at very high risk.    · HDL above 50 is good, above 60 is best.    · Triglycerides below 150.    HOW CAN I LOWER MY CHOLESTEROL?  · Diet. Follow your diet programs as directed by your health care provider.    ¨ Choose fish or white meat chicken and turkey, roasted or baked. Limit fatty cuts of red meat, fried foods, and processed meats, such as sausage and lunch meats.    ¨ Eat lots of fresh fruits and vegetables.  ¨ Choose whole grains, beans, pasta, potatoes, and cereals.    ¨ Use only small amounts of olive, corn, or canola oils.    ¨ Avoid butter, mayonnaise, shortening, or palm kernel oils.  ¨ Avoid foods with  trans fats.    ¨ Drink skim or nonfat milk and eat low-fat or nonfat yogurt and cheeses. Avoid whole milk, cream, ice cream, egg yolks, and full-fat cheeses.    ¨ Healthy desserts include anita food cake, kulwinder snaps, animal crackers, hard candy, popsicles, and low-fat or nonfat frozen yogurt. Avoid pastries, cakes, pies, and cookies.    · Exercise. Follow your exercise programs as directed by your health care provider.    ¨ A regular program helps decrease LDL and raise HDL.    ¨ A regular program helps with weight control.    ¨ Do things that increase your activity level like gardening, walking, or taking the stairs. Ask your health care provider about how you can be more active in your daily life.    · Medicine. Take medicine only as directed by your health care provider.    ¨ Medicine may be prescribed by your health care provider to help lower cholesterol and decrease the risk for heart disease.    ¨ If you have several risk factors, you may need medicine even if your levels are normal.     This information is not intended to replace advice given to you by your health care provider. Make sure you discuss any questions you have with your health care provider.     Document Released: 09/12/2002 Document Revised: 01/08/2016 Document Reviewed: 10/01/2014  [employer] Interactive Patient Education ©2016 [employer]

## 2021-11-16 ENCOUNTER — HOSPITAL ENCOUNTER (EMERGENCY)
Facility: MEDICAL CENTER | Age: 46
End: 2021-11-16
Attending: EMERGENCY MEDICINE
Payer: OTHER MISCELLANEOUS

## 2021-11-16 ENCOUNTER — TELEPHONE (OUTPATIENT)
Dept: MEDICAL GROUP | Facility: PHYSICIAN GROUP | Age: 46
End: 2021-11-16

## 2021-11-16 VITALS
OXYGEN SATURATION: 97 % | WEIGHT: 185 LBS | TEMPERATURE: 97.9 F | BODY MASS INDEX: 25.9 KG/M2 | SYSTOLIC BLOOD PRESSURE: 145 MMHG | HEIGHT: 71 IN | DIASTOLIC BLOOD PRESSURE: 86 MMHG | RESPIRATION RATE: 16 BRPM | HEART RATE: 76 BPM
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
## 2021-11-16 DIAGNOSIS — S50.01XA CONTUSION OF RIGHT ELBOW, INITIAL ENCOUNTER: ICD-10-CM

## 2021-11-16 PROCEDURE — 99282 EMERGENCY DEPT VISIT SF MDM: CPT

## 2021-11-16 ASSESSMENT — FIBROSIS 4 INDEX: FIB4 SCORE: 0.84

## 2021-11-16 NOTE — DISCHARGE INSTRUCTIONS
Return to the ER for increasing pain, numbness, weakness, or other concerns    Apply ice, rest, take Tylenol and/or ibuprofen as needed for pain    Follow-up with Workman's Compensation clinic for recheck on Wednesday

## 2021-11-16 NOTE — TELEPHONE ENCOUNTER
Phone Number Called: [phone]    Call outcome: Left detailed message for patient. Informed to call back with any additional questions.    Message:  If your emplyer does not provide worker's compensation coverage, please call [phone] or Nurse Louie at [phone] to schedule a follow-up visit with your primary care provider following your emergency room visit on 11/16/21.     Patient returned my call, he works for the [employer] and will follow-up with the worker's comp provider for follow-up care.

## 2021-11-16 NOTE — LETTER
"  FORM C-4:  EMPLOYEE’S CLAIM FOR COMPENSATION/ REPORT OF INITIAL TREATMENT  EMPLOYEE’S CLAIM - PROVIDE ALL INFORMATION REQUESTED   First Name Bertrand Maria Last Name Lc Birthdate 1975  Sex male Claim Number   Home Address [address]             Zip [address]                                   Age  46 y.o. Height  1.803 m (5' 11\") Weight  83.9 kg (185 lb) N  [id number]   Mailing Address [address]              Zip [address] Telephone  [phone] (home) [phone] (work) Primary Language Spoken  ENGLISH   Insurer   Third Party   [employer] Employee's Occupation (Job Title) When Injury or Occupational Disease Occurred     Employer's Name [employer] Telephone [phone]    Employer Address [address] Zip [address]   Date of Injury  11/16/2021       Hour of Injury  3:20 AM Date Employer Notified  11/16/2021 Last Day of Work after Injury or Occupational Disease  11/16/2021 Supervisor to Whom Injury Reported  Arteaga   Address or Location of Accident (if applicable) Work [1]   What were you doing at the time of accident? (if applicable) unstrapping to unload the trailer    How did this injury or occupational disease occur? Be specific and answer in detail. Use additional sheet if necessary)  i was on the dock plate, unstrapping the trailer when the truck moved forward and i fell on the ground with my elbow first and rollder over because the trailer is about to back up.   If you believe that you have an occupational disease, when did you first have knowledge of the disability and it relationship to your employment? na Witnesses to the Accident  Crispino David   Nature of Injury or Occupational Disease  Workers' Compensation Part(s) of Body Injured or Affected  Elbow (R), Shoulder (R), N/A    I CERTIFY THAT THE ABOVE IS TRUE AND CORRECT TO THE BEST OF MY KNOWLEDGE AND THAT I HAVE PROVIDED " THIS INFORMATION IN ORDER TO OBTAIN THE BENEFITS OF [place] CHAPTER 617 OF [place]).  I HEREBY AUTHORIZE ANY PHYSICIAN, CHIROPRACTOR, SURGEON, PRACTITIONER, OR OTHER PERSON, ANY HOSPITAL, INCLUDING [facility] OR [facility], ANY MEDICAL SERVICE ORGANIZATION, ANY INSURANCE COMPANY, OR OTHER INSTITUTION OR ORGANIZATION TO RELEASE TO EACH OTHER, ANY MEDICAL OR OTHER INFORMATION, INCLUDING BENEFITS PAID OR PAYABLE, PERTINENT TO THIS INJURY OR DISEASE, EXCEPT INFORMATION RELATIVE TO DIAGNOSIS, TREATMENT AND/OR COUNSELING FOR AIDS, PSYCHOLOGICAL CONDITIONS, ALCOHOL OR CONTROLLED SUBSTANCES, FOR WHICH I MUST GIVE SPECIFIC AUTHORIZATION.  A PHOTOSTAT OF THIS AUTHORIZATION SHALL BE AS VALID AS THE ORIGINAL.  Date    11/16/2021                                  Place             [facility]                                             Employee’s Signature   THIS REPORT MUST BE COMPLETED AND MAILED WITHIN 3 WORKING DAYS OF TREATMENT   Place [facility], EMERGENCY DEPT                       Name of Facility [facility]   Date  11/16/2021 Diagnosis  (W19.XXXA) Fall, initial encounter  (S50.01XA) Contusion of right elbow, initial encounter Is there evidence the injured employee was under the influence of alcohol and/or another controlled substance at the time of accident?   Hour  6:34 AM Description of Injury or Disease  Fall, initial encounter  Contusion of right elbow, initial encounter No   Treatment  examined  Have you advised the patient to remain off work five days or more?         No   X-Ray Findings    Comments:N/A If Yes   From Date    To Date      From information given by the employee, together with medical evidence, can you directly connect this injury or occupational disease as job incurred? Yes If No, is employee capable of: Full Duty  No Modified Duty  Yes   Is additional medical care  "by a physician indicated?   Comments:recheck in 2 days for return to full duty If Modified Duty, Specify any Limitations / Restrictions   No use of right arm for 2 days   Do you know of any previous injury or disease contributing to this condition or occupational disease? No    Date 11/16/2021 Print Doctor’s Name Connie Rosario certify the employer’s copy of this form was mailed on:   Address [address] [place] [id number] [phone] INSURER’S USE ONLY   Provider’s Tax ID Number   Telephone Dept: [phone]    Doctor’s Signature liliana-CONNIE Carmen M.D. Degree  MD      Form C-4 (rev.10/07)                                                                         BRIEF DESCRIPTION OF RIGHTS AND BENEFITS  (Pursuant to [place]RS 616C.050)    Notice of Injury or Occupational Disease (Incident Report Form C-1): If an injury or occupational disease (OD) arises out of and in the course of employment, you must provide written notice to your employer as soon as practicable, but no later than 7 days after the accident or OD. Your employer shall maintain a sufficient supply of the required forms.    Claim for Compensation (Form C-4): If medical treatment is sought, the form C-4 is available at the place of initial treatment. A completed \"Claim for Compensation\" (Form C-4) must be filed within 90 days after an accident or OD. The treating physician or chiropractor must, within 3 working days after treatment, complete and mail to the employer, the employer's insurer and third-party , the Claim for Compensation.    Medical Treatment: If you require medical treatment for your on-the-job injury or OD, you may be required to select a physician or chiropractor from a list provided by your workers’ compensation insurer, if it has contracted with an Organization for Managed Care (MCO) or Preferred Provider Organization (PPO) or providers of health care. If your employer has not entered into a contract with an " MCO or PPO, you may select a physician or chiropractor from the Panel of Physicians and Chiropractors. Any medical costs related to your industrial injury or OD will be paid by your insurer.    Temporary Total Disability (TTD): If your doctor has certified that you are unable to work for a period of at least 5 consecutive days, or 5 cumulative days in a 20-day period, or places restrictions on you that your employer does not accommodate, you may be entitled to TTD compensation.    Temporary Partial Disability (TPD): If the wage you receive upon reemployment is less than the compensation for TTD to which you are entitled, the insurer may be required to pay you TPD compensation to make up the difference. TPD can only be paid for a maximum of 24 months.    Permanent Partial Disability (PPD): When your medical condition is stable and there is an indication of a PPD as a result of your injury or OD, within 30 days, your insurer must arrange for an evaluation by a rating physician or chiropractor to determine the degree of your PPD. The amount of your PPD award depends on the date of injury, the results of the PPD evaluation, your age and wage.    Permanent Total Disability (PTD): If you are medically certified by a treating physician or chiropractor as permanently and totally disabled and have been granted a PTD status by your insurer, you are entitled to receive monthly benefits not to exceed 66 2/3% of your average monthly wage. The amount of your PTD payments is subject to reduction if you previously received a lump-sum PPD award.    Vocational Rehabilitation Services: You may be eligible for vocational rehabilitation services if you are unable to return to the job due to a permanent physical impairment or permanent restrictions as a result of your injury or occupational disease.    Transportation and Per Jose Alfredo Reimbursement: You may be eligible for travel expenses and per jose alfredo associated with medical  treatment.    Reopening: You may be able to reopen your claim if your condition worsens after claim closure.     Appeal Process: If you disagree with a written determination issued by the insurer or the insurer does not respond to your request, you may appeal to the Department of Administration, , by following the instructions contained in your determination letter. You must appeal the determination within 70 days from the date of the determination letter at [address], [address], [place], [place] [address], or [address], [place], [place] [address]. If you disagree with the  decision, you may appeal to the Department of Administration, . You must file your appeal within 30 days from the date of the  decision letter at [address], [address], [place], [place] [address], or [address], [place], [place] [address]. If you disagree with a decision of an , you may file a petition for judicial review with the District Court. You must do so within 30 days of the Appeal Officer’s decision. You may be represented by an  at your own expense or you may contact the [facility] for possible representation.    [place]  for Injured Workers (NAIW): If you disagree with a  decision, you may request that NAIW represent you without charge at an  Hearing. For information regarding denial of benefits, you may contact the [facility] at: [address], [address], [place] [address], [phone], or [address], [address], [place] [address], [phone]    To File a Complaint with the Division: If you wish to file a complaint with the  of the Division of Industrial Relations (DIR),  please contact the Workers’ Compensation Section, [address], [address], [place], [place] [address], telephone [phone], or [address], [address], [place], [place] [address], telephone [phone].    For assistance with Workers’ Compensation Issues: You may contact the [facility] Office for Consumer Health Assistance, [facility], [place], [place] [address], Toll Free 1-266.852.1380, Web site: http://[facility].[place].gov/Programs/EMILE E-mail: emile@[facility].[place].gov  D-2 (rev. 10/20)              __________________________________________________________________                                    11/16/2021_________________            Employee Name / Signature                                                                                                                            Date

## 2021-11-16 NOTE — LETTER
"  FORM C-4:  EMPLOYEE’S CLAIM FOR COMPENSATION/ REPORT OF INITIAL TREATMENT  EMPLOYEE’S CLAIM - PROVIDE ALL INFORMATION REQUESTED   First Name Bertrand Maria Last Name Lc Birthdate 1975  Sex male Claim Number   Home Address [address]             Zip [address]                                   Age  46 y.o. Height  1.803 m (5' 11\") Weight  83.9 kg (185 lb) [facility]  [id number]   Mailing Address [address]              Zip [address] Telephone  [phone] (home) [phone] (work) Primary Language Spoken   Insurer  *** Third Party   [employer] Employee's Occupation (Job Title) When Injury or Occupational Disease Occurred     Employer's Name  [employer] Telephone [phone]    Employer Address [address]] Zip [address]   Date of Injury  11/16/2021       Hour of Injury  3:20 AM Date Employer Notified  11/16/2021 Last Day of Work after Injury or Occupational Disease  11/16/2021 Supervisor to Whom Injury Reported  Arteaga   Address or Location of Accident (if applicable) Work [1]   What were you doing at the time of accident? (if applicable) unstrapping to unload the trailer    How did this injury or occupational disease occur? Be specific and answer in detail. Use additional sheet if necessary)  i was on the dock plate, unstrapping the trailer when the truck moved forward and i fell on the ground with my elbow first and rollder over because the trailer is about to back up.   If you believe that you have an occupational disease, when did you first have knowledge of the disability and it relationship to your employment? na Witnesses to the Accident  Crispino David   Nature of Injury or Occupational Disease  Workers' Compensation Part(s) of Body Injured or Affected  Elbow (R), Shoulder (R), N/A    I CERTIFY THAT THE ABOVE IS TRUE AND CORRECT TO THE BEST OF MY KNOWLEDGE AND THAT I HAVE PROVIDED THIS INFORMATION IN " ORDER TO OBTAIN THE B[place]EF[place] OF [place], [place]IVE [place] CHAPTER 617 OF [place]).  I HEREBY AUTHORIZE ANY PHYSICIAN, CHIROPRACTOR, SURGEON, PRACTITIONER, OR OTHER PERSON, ANY HOSPITAL, INCLUDING [facility] OR [facility] H[facility]PITAL, ANY MEDICAL SERVICE ORGANIZATION, ANY INSURANCE COMPANY, OR OTHER INSTITUTION OR ORGANIZATION TO RELEASE TO EACH OTHER, ANY MEDICAL OR OTHER INFORMATION, INCLUDING BENEFITS PAID OR PAYABLE, PERTINENT TO THIS INJURY OR DISEASE, EXCEPT INFORMATION RELATIVE TO DIAGNOSIS, TREATMENT AND/OR COUNSELING FOR AIDS, PSYCHOLOGICAL CONDITIONS, ALCOHOL OR CONTROLLED SUBSTANCES, FOR WHICH I MUST GIVE SPECIFIC AUTHORIZATION.  A PHOTOSTAT OF THIS AUTHORIZATION SHALL BE AS VALID AS THE ORIGINAL.  Date                                      Place                                                                             Employee’s Signature   THIS REPORT MUST BE COMPLETED AND MAILED WITHIN 3 WORKING DAYS OF TREATMENT   Place [facility], EMERGENCY DEPT                       Name of Facility [facility]   Date  11/16/2021 Diagnosis  (W19.XXXA) Fall, initial encounter  (S50.01XA) Contusion of right elbow, initial encounter Is there evidence the injured employee was under the influence of alcohol and/or another controlled substance at the time of accident?   Hour  6:44 AM Description of Injury or Disease  Fall, initial encounter  Contusion of right elbow, initial encounter No   Treatment  examined  Have you advised the patient to remain off work five days or more?         No   X-Ray Findings    Comments:N/A If Yes   From Date    To Date      From information given by the employee, together with medical evidence, can you directly connect this injury or occupational disease as job incurred? Yes If No, is employee capable of: Full Duty  No Modified Duty  Yes   Is additional medical care by a physician  "indicated?   Comments:recheck in 2 days for return to full duty If Modified Duty, Specify any Limitations / Restrictions   No use of right arm for 2 days   Do you know of any previous injury or disease contributing to this condition or occupational disease? No    Date 11/16/2021 Print Doctor’s Name Connie Rosario certify the employer’s copy of this form was mailed on:   Address [address] [place] [id number] [phone] INSURER’S USE ONLY   Provider’s Tax ID Number   Telephone Dept: [phone]    Doctor’s Signature CONNIE Curran M.D. Degree        Form C-4 (rev.10/07)                                                                         BRIEF DESCRIPTION OF RIGHTS AND BENEFITS  ([place]ursuant to [place] 616C.050)    Notice of Injury or Occupational Disease (Incident Report Form C-1): If an injury or occupational disease (OD) arises out of and in the course of employment, you must provide written notice to your employer as soon as practicable, but no later than 7 days after the accident or OD. Your employer shall maintain a sufficient supply of the required forms.    Claim for Compensation (Form C-4): If medical treatment is sought, the form C-4 is available at the place of initial treatment. A completed \"Claim for Compensation\" (Form C-4) must be filed within 90 days after an accident or OD. The treating physician or chiropractor must, within 3 working days after treatment, complete and mail to the employer, the employer's insurer and third-party , the Claim for Compensation.    Medical Treatment: If you require medical treatment for your on-the-job injury or OD, you may be required to select a physician or chiropractor from a list provided by your workers’ compensation insurer, if it has contracted with an Organization for Managed Care (MCO) or Preferred Provider Organization (PPO) or providers of health care. If your employer has not entered into a contract with an MCO or PPO, you " may select a physician or chiropractor from the Panel of Physicians and Chiropractors. Any medical costs related to your industrial injury or OD will be paid by your insurer.    Temporary Total Disability (TTD): If your doctor has certified that you are unable to work for a period of at least 5 consecutive days, or 5 cumulative days in a 20-day period, or places restrictions on you that your employer does not accommodate, you may be entitled to TTD compensation.    Temporary Partial Disability (TPD): If the wage you receive upon reemployment is less than the compensation for TTD to which you are entitled, the insurer may be required to pay you TPD compensation to make up the difference. TPD can only be paid for a maximum of 24 months.    Permanent Partial Disability (PPD): When your medical condition is stable and there is an indication of a PPD as a result of your injury or OD, within 30 days, your insurer must arrange for an evaluation by a rating physician or chiropractor to determine the degree of your PPD. The amount of your PPD award depends on the date of injury, the results of the PPD evaluation, your age and wage.    Permanent Total Disability (PTD): If you are medically certified by a treating physician or chiropractor as permanently and totally disabled and have been granted a PTD status by your insurer, you are entitled to receive monthly benefits not to exceed 66 2/3% of your average monthly wage. The amount of your PTD payments is subject to reduction if you previously received a lump-sum PPD award.    Vocational Rehabilitation Services: You may be eligible for vocational rehabilitation services if you are unable to return to the job due to a permanent physical impairment or permanent restrictions as a result of your injury or occupational disease.    Transportation and Per Jose Alfredo Reimbursement: You may be eligible for travel expenses and per jose alfredo associated with medical treatment.    Reopening: You may  be able to reopen your claim if your condition worsens after claim closure.     Appeal Process: If you disagree with a written determination issued by the insurer or the insurer does not respond to your request, you may appeal to the Department of Administration, , by following the instructions contained in your determination letter. You must appeal the determination within 70 days from the date of the determination letter at [address], [address], [place], [place] [address], or [address], [place], [place] [address]. If you disagree with the  decision, you may appeal to the Department of Administration, . You must file your appeal within 30 days from the date of the  decision letter at [address], [address], [place], [place] [address], or [address], [address], [place], [place] [address]. If you disagree with a decision of an , you may file a petition for judicial review with the District Court. You must do so within 30 days of the Appeal Officer’s decision. You may be represented by an  at your own expense or you may contact the [facility] for possible representation.    [place]  for Inj[place] Workers ([place]IW): If you disagree with a  decision, you may request that NAIW represent you without charge at an  Hearing. For information regarding denial of benefits, you may contact the [facility] at: [address], [address], [place] [address], [phone], or [address], [place] [address], [phone]    To File a Complaint with the Division: If you wish to file a complaint with the  of the Division of Industrial Relations (DIR),  please contact the Workers’ Compensation Section, [address], [address], [place], [place] [address], telephone [phone], or [address], [address], [facility]  [place] [address], telephone [phone].    For assistance with Workers’ Compensation Issues: You may contact the [facility] Office for Consumer Health Assistance, [address] [facility], [place], [place] [address], Toll Free 1-709.410.2062, Web site: http://[facility].[place].gov/Programs/EMILE E-mail: emile@[email or web address].[email or web address].gov  D-2 (rev. 10/20)              __________________________________________________________________                                    _________________            Employee Name / Signature                                                                                                                            Date

## 2021-11-16 NOTE — ED PROVIDER NOTES
"ED Provider Note    CHIEF COMPLAINT  Chief Complaint   Patient presents with   • Fall   • Arm Pain       HPI  Bertrand Platt is a 46 y.o. male who presents complaining of right elbow pain after fall at work.     Patient was up on a 3 to 4 foot high surface when he fell onto his right elbow.  He has mild achy pain in the right elbow radiates into his shoulder.  He denies head injury, neck pain, back pain, weakness, numbness, other injuries. He describes the pain as mild.  He states \"I do not think it is broken.\"  He declines pain medications.      ALLERGIES  No Known Allergies    CURRENT MEDICATIONS  Home Medications     Reviewed by Shelly Hernandez R.N. (Registered Nurse) on 21 at 0417  Med List Status: Partial   Medication Last Dose Status   amLODIPine (NORVASC) 10 MG Tab  Active                PAST MEDICAL HISTORY   has a past medical history of HTN (hypertension) and Hypertension.    SURGICAL HISTORY  patient denies any surgical history    SOCIAL HISTORY  Social History     Tobacco Use   • Smoking status: Former Smoker     Years: 4.00     Types: Cigarettes     Quit date: 2002     Years since quittin.4   • Smokeless tobacco: Never Used   • Tobacco comment: smoked 3 cig/day, quit in    Vaping Use   • Vaping Use: Never used   Substance and Sexual Activity   • Alcohol use: No     Alcohol/week: 0.0 oz   • Drug use: No   • Sexual activity: Yes     Partners: Female         REVIEW OF SYSTEMS  Patient admits to fall, right elbow and shoulder pain   pt denies head injury, neck pain, weakness, numbness  See HPI for further details.       PHYSICAL EXAM  VITAL SIGNS: /87   Pulse 63   Temp 36.6 °C (97.9 °F) (Temporal)   Resp 14   Ht 1.803 m (5' 11\")   Wt 83.9 kg (185 lb)   SpO2 97%   BMI 25.80 kg/m²    General:  WDWN male, nontoxic appearing in NAD; A+Ox3; V/S as above  Skin: warm and dry; good color; no rash  HEENT: NCAT; EOMs intact; PERRL; no scleral icterus   Neck: FROM  Extremities: SHEN x 4; " no e/o trauma; full range of motion at the shoulder and elbow joints without bony tenderness or obvious deformity; no clavicular tenderness; radial pulse 2+  Neurologic: CNs III-XII grossly intact; speech clear; distal sensation intact; strength 5/5 right upper extremity  Psychiatric: Appropriate affect, normal mood    MEDICAL RECORD  I have reviewed the patient's medical record and pertinent results as listed.      COURSE & MEDICAL DECISION MAKING  I have reviewed any medical record information, laboratory studies and radiographic results as noted.    Appropriate PPE was worn at all times while interacting with the patient.    Bertrand Platt is a 46 y.o. male who presents complaining of mild right elbow pain following a work-related injury where he fell from 4 feet up onto his elbow.  He is neurovascularly intact.  There is no bony point tenderness and he has full range of motion.  No x-rays indicated today.  We have advised RICE and no use of his right arm for 2 days with recheck on Wednesday for anticipated return to full duty.  He was given return precautions.      FINAL IMPRESSION  1. Fall, initial encounter     2. Contusion of right elbow, initial encounter              Electronically signed by: Connie Rosario M.D., 11/16/2021 6:07 AM

## 2021-11-16 NOTE — ED NOTES
Patient discharged in stable condition per orders. Patient verbalized understanding of all discharge instructions. All belongings accounted for. Ambulatory to lobby with steady gait.

## 2021-11-16 NOTE — ED TRIAGE NOTES
Bertrand Platt  46 y.o.  Chief Complaint   Patient presents with   • Fall   • Arm Pain     Patient to triage for above. Fell while unloading a trailer at work. Fell onto R elbow/shoulder. Denies hitting head or LOC.     Pt ambulatory, A&Ox4, no acute distress, speaking in full sentences.     Triage process explained to patient, apologized for wait time, [facility]  Pt informed to notify staff of any change in condition.

## 2021-11-18 ENCOUNTER — APPOINTMENT (OUTPATIENT)
Dept: RADIOLOGY | Facility: IMAGING CENTER | Age: 46
End: 2021-11-18
Attending: NURSE PRACTITIONER
Payer: COMMERCIAL

## 2021-11-18 ENCOUNTER — OCCUPATIONAL MEDICINE (OUTPATIENT)
Dept: OCCUPATIONAL MEDICINE | Facility: CLINIC | Age: 46
End: 2021-11-18
Payer: OTHER MISCELLANEOUS

## 2021-11-18 VITALS
HEIGHT: 71 IN | OXYGEN SATURATION: 97 % | SYSTOLIC BLOOD PRESSURE: 138 MMHG | BODY MASS INDEX: 26.01 KG/M2 | RESPIRATION RATE: 18 BRPM | WEIGHT: 185.8 LBS | DIASTOLIC BLOOD PRESSURE: 86 MMHG | HEART RATE: 69 BPM

## 2021-11-18 DIAGNOSIS — S16.1XXD STRAIN OF NECK MUSCLE, SUBSEQUENT ENCOUNTER: ICD-10-CM

## 2021-11-18 DIAGNOSIS — S49.91XD INJURY OF RIGHT UPPER EXTREMITY, SUBSEQUENT ENCOUNTER: ICD-10-CM
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
## 2021-11-18 DIAGNOSIS — I10 ESSENTIAL HYPERTENSION: ICD-10-CM

## 2021-11-18 DIAGNOSIS — S50.01XD CONTUSION OF RIGHT ELBOW, SUBSEQUENT ENCOUNTER: ICD-10-CM

## 2021-11-18 PROCEDURE — 73030 X-RAY EXAM OF SHOULDER: CPT | Mod: TC,RT | Performed by: NURSE PRACTITIONER

## 2021-11-18 PROCEDURE — 73080 X-RAY EXAM OF ELBOW: CPT | Mod: TC,RT | Performed by: NURSE PRACTITIONER

## 2021-11-18 PROCEDURE — 71101 X-RAY EXAM UNILAT RIBS/CHEST: CPT | Mod: TC,RT | Performed by: NURSE PRACTITIONER

## 2021-11-18 PROCEDURE — 72040 X-RAY EXAM NECK SPINE 2-3 VW: CPT | Mod: TC | Performed by: NURSE PRACTITIONER

## 2021-11-18 PROCEDURE — 99213 OFFICE O/P EST LOW 20 MIN: CPT | Performed by: NURSE PRACTITIONER

## 2021-11-18 ASSESSMENT — FIBROSIS 4 INDEX: FIB4 SCORE: 0.84

## 2021-11-18 NOTE — PROGRESS NOTES
"Subjective:     Bertrand Platt is a 46 y.o. male who presents for Follow-Up ([facility] New[employer] DOI 11/16/21 Rt elbow, Rt shoulder, better, rm 16)      DOI 11/16/21: Patient was up on a 3 to 4 foot high surface when he fell onto his right elbow.  He has mild achy pain in the right elbow radiates into his shoulder.  Patient seen in ED x 1 for this injury. Today patient reports some soreness in his neck, right shoulder, right elbow, and his right rib cage area.  Reports he never experienced any bruising.  He states he has had some mild numbness and tingling otherwise no new or worsening symptoms.  He denies numbness in the shoulder, tingling in the shoulder, weakness, cough no blood, shortness of breath, or continuous cough.  He is not taking anything OTC for this injury.  He feels he can do his job and take OTC medication if needed. Xray's obtained and results reviewed with patient. He was on light duty restrictions the last 2 days which was helpful. Patient will be released from care with continued resolution of symptoms over time. Plan of care discussed with patient.     ROS: All systems were reviewed on intake form, form was reviewed and signed. See scanned documents in media. Pertinent positives and negatives included in HPI.    PMH: No pertinent past medical history to this problem  MEDS: Medications were reviewed in Epic  ALLERGIES: No Known Allergies  SOCHX: Works as  at [employer]  FH: No pertinent family history to this problem       Objective:     /86   Pulse 69   Resp 18   Ht 1.803 m (5' 11\")   Wt 84.3 kg (185 lb 12.8 oz)   SpO2 97%   BMI 25.91 kg/m²     [unfilled]    Right upper extremtiy:  No gross deformity or discoloration noted. Full range of motion at the shoulder and elbow joints without bony tenderness or obvious deformity; no clavicular tenderness; radial pulse 2+.  strength is 5/5.     Cervical: Neg gross deformity. FROM, no pain with flexion/extension.  No JVD, cervical adenopathy, " or cervical rigidity noted.  Mild TTP to the right trapezius.  No clavicle deformities or tenderness noted.    Chest: No gross deformity or discolorations noted.  Lungs clear to auscultation in all lung fields.  No dyspnea, crackles, or wheezing noted. Mild TTP to the right rib cage area.       Assessment/Plan:       1. Contusion of right elbow, subsequent encounter  - DX-ELBOW-COMPLETE 3+ RIGHT; Future    2. Fall, subsequent encounter  - GE-PBYS-[id number] (WITH 1-VIEW CXR) RIGHT; Future  - DX-CERVICAL SPINE-2 OR 3 VIEWS; Future  - DX-ELBOW-COMPLETE 3+ RIGHT; Future  - DX-SHOULDER 2+ RIGHT; Future    3. Injury of right upper extremity, subsequent encounter  - DX-SHOULDER 2+ RIGHT; Future    4. Strain of neck muscle, subsequent encounter  - DX-CERVICAL SPINE-2 OR 3 VIEWS; Future    Released to Full Duty FROM 11/18/2021 TO       Discharged/MMI  Released to Full Duty   Follow-up if needed     Differential diagnosis, natural history, supportive care, and indications for immediate follow-up discussed.    Approximately 25 minutes were spent in reviewing notes, preparing for visit, obtaining history, exam and evaluation, patient counseling/education and post visit documentation/orders.

## 2021-11-18 NOTE — LETTER
[address],   [address] [place] [address]  Phone:  [phone] - Fax:  [phone]   [facility] Progress Report and Disability Certification  Date of Service: 11/18/2021   No Show:  No  Date / Time of Next Visit:   Discharged/MMI  Released to Full Duty    Claim Information   Patient Name: Bertrand Platt  Claim Number:     Employer:  [employer]  Date of Injury: 11/16/2021     Insurer / TPA: [employer]  ID / SSN:     Occupation:   Diagnosis: Diagnoses of Contusion of right elbow, subsequent encounter, Fall, subsequent encounter, Injury of right upper extremity, subsequent encounter, and Strain of neck muscle, subsequent encounter were pertinent to this visit.    Medical Information   Related to Industrial Injury? Yes    Subjective Complaints:  DOI 11/16/21: Patient was up on a 3 to 4 foot high surface when he fell onto his right elbow.  He has mild achy pain in the right elbow radiates into his shoulder.  Patient seen in ED x 1 for this injury. Today patient reports some soreness in his neck, right shoulder, right elbow, and his right rib cage area.  Reports he never experienced any bruising.  He states he has had some mild numbness and tingling otherwise no new or worsening symptoms.  He denies numbness in the shoulder, tingling in the shoulder, weakness, cough no blood, shortness of breath, or continuous cough.  He is not taking anything OTC for this injury.  He feels he can do his job and take OTC medication if needed. Xray's obtained and results reviewed with patient. He was on light duty restrictions the last 2 days which was helpful. Patient will be released from care with continued resolution of symptoms over time. Plan of care discussed with patient.    Objective Findings: Right upper extremtiy:  No gross deformity or discoloration noted. Full range of motion at the shoulder and elbow joints without bony  tenderness or obvious deformity; no clavicular tenderness; radial pulse 2+.  strength is 5/5.     Cervical: Neg gross deformity. FROM, no pain with flexion/extension.  No JVD, cervical adenopathy, or cervical rigidity noted.  Mild TTP to the right trapezius.  No clavicle deformities or tenderness noted.    Chest: No gross deformity or discolorations noted.  Lungs clear to auscultation in all lung fields.  No dyspnea, crackles, or wheezing noted. Mild TTP to the right rib cage area.      Pre-Existing Condition(s):     Assessment:   Condition Improved    Status: Discharged /  MMI  Permanent Disability:No    Plan:      Diagnostics:      Comments:  Discharged/MMI  Released to Full Duty   Follow-up if needed     Disability Information   Status: Released to Full Duty    From:  11/18/2021  Through:   Restrictions are:     Physical Restrictions   Sitting:    Standing:    Stooping:    Bending:      Squatting:    Walking:    Climbing:    Pushing:      Pulling:    Other:    Reaching Above Shoulder (L):   Reaching Above Shoulder (R):       Reaching Below Shoulder (L):    Reaching Below Shoulder (R):      Not to exceed Weight Limits   Carrying(hrs):   Weight Limit(lb):   Lifting(hrs):   Weight  Limit(lb):     Comments:      Repetitive Actions   Hands: i.e. Fine Manipulations from Grasping:     Feet: i.e. Operating Foot Controls:     Driving / Operate Machinery:     Health Care Provider’s Original or Electronic Signature  SHORTY Sweeney. Health Care Provider’s Original or Electronic Signature    Jaswinder Norwood MD         Clinic Name / Location: [address]   [address] [place] [address] Clinic Phone Number: Dept: [phone]   Appointment Time: 11:15 Am Visit Start Time: 11:09 AM   Check-In Time:  11:02 Am Visit Discharge Time:  12:15 pm    Original-Treating Physician or Chiropractor    Page 2-Insurer/TPA    Page 3-Employer    Page 4-Employee

## 2021-11-19 RX ORDER — AMLODIPINE BESYLATE 10 MG/1
10 TABLET ORAL
Qty: 90 TABLET | Refills: 1 | Status: SHIPPED | OUTPATIENT
Start: 2021-11-19 | End: 2022-02-13

## 2022-02-12 DIAGNOSIS — I10 ESSENTIAL HYPERTENSION: ICD-10-CM

## 2022-02-13 RX ORDER — AMLODIPINE BESYLATE 10 MG/1
10 TABLET ORAL
Qty: 90 TABLET | Refills: 1 | Status: SHIPPED | OUTPATIENT
Start: 2022-02-13 | End: 2022-08-21

## 2022-03-28 ENCOUNTER — OFFICE VISIT (OUTPATIENT)
Dept: MEDICAL GROUP | Facility: PHYSICIAN GROUP | Age: 47
End: 2022-03-28
Payer: COMMERCIAL

## 2022-03-28 VITALS
OXYGEN SATURATION: 97 % | HEIGHT: 71 IN | BODY MASS INDEX: 26.27 KG/M2 | WEIGHT: 187.61 LBS | DIASTOLIC BLOOD PRESSURE: 80 MMHG | SYSTOLIC BLOOD PRESSURE: 120 MMHG | HEART RATE: 65 BPM | TEMPERATURE: 97.7 F

## 2022-03-28 DIAGNOSIS — I10 ESSENTIAL HYPERTENSION: ICD-10-CM

## 2022-03-28 DIAGNOSIS — E78.5 DYSLIPIDEMIA: ICD-10-CM

## 2022-03-28 PROCEDURE — 99213 OFFICE O/P EST LOW 20 MIN: CPT | Performed by: FAMILY MEDICINE

## 2022-03-28 ASSESSMENT — FIBROSIS 4 INDEX: FIB4 SCORE: 0.85

## 2022-03-28 ASSESSMENT — PATIENT HEALTH QUESTIONNAIRE - PHQ9: CLINICAL INTERPRETATION OF PHQ2 SCORE: 0

## 2022-03-28 NOTE — PROGRESS NOTES
"Subjective     Bertrand Platt is a 47 y.o. male who presents with Hypertension            HPI     Patient returns for follow-up of his medical problems.    In terms of his hypertension, he continues to take amlodipine 10 mg 1 tablet daily without side effects with good control of his blood pressure.    For the dyslipidemia, he is managing this with his own efforts.  The last lipid panel was 6 months ago which came back the LDL increased from  with a 10-year ASCVD risk score 1.8%.    He has no acute problems or concerns today.  He needs his booster shot for COVID-19.    Past medical history, past surgical history, family history reviewed-no changes    Social history reviewed-no changes    Allergies reviewed-no changes    Medications reviewed-no changes      ROS     as per HPI, the rest are negative.                Objective     /80 (BP Location: Left arm, Patient Position: Sitting, BP Cuff Size: Adult)   Pulse 65   Temp 36.5 °C (97.7 °F) (Temporal)   Ht 1.803 m (5' 11\")   Wt 85.1 kg (187 lb 9.8 oz)   SpO2 97%   BMI 26.17 kg/m²      Physical Exam     Examined alert, awake, oriented, not in distress    Neck-supple, no lymphadenopathy, no thyromegaly  Lungs-clear to auscultation, no rales, no wheezes  Heart-regular rate and rhythm, no murmur  Extremities-no edema, clubbing, cyanosis                        Assessment & Plan        1. Essential hypertension  Well-controlled.  Continue amlodipine.  - Lipid Profile; Future  - Comp Metabolic Panel; Future  - CBC WITH DIFFERENTIAL; Future    2. Dyslipidemia  He continues to manage this with his own efforts only.  We will do updated blood work next visit.  Continue watching the diet and I discussed with him in detail the diet he needs to follow.  Advised regular exercises and keeping a healthy weight.  - Lipid Profile; Future  - Comp Metabolic Panel; Future  - CBC WITH DIFFERENTIAL; Future     follow-up in 6 months.    Please note that this dictation was " created using voice recognition software. I have worked with consultants from the vendor as well as technical experts from [facility] to optimize the interface. I have made every reasonable attempt to correct obvious errors, but I expect that there are errors of grammar and possibly content I did not discover before finalizing the note.

## 2022-08-21 DIAGNOSIS — I10 ESSENTIAL HYPERTENSION: ICD-10-CM

## 2022-08-21 RX ORDER — AMLODIPINE BESYLATE 10 MG/1
10 TABLET ORAL
Qty: 90 TABLET | Refills: 1 | Status: SHIPPED | OUTPATIENT
Start: 2022-08-21 | End: 2022-10-05 | Stop reason: SDUPTHER

## 2022-09-24 ENCOUNTER — HOSPITAL ENCOUNTER (OUTPATIENT)
Dept: LAB | Facility: MEDICAL CENTER | Age: 47
End: 2022-09-24
Attending: FAMILY MEDICINE
Payer: COMMERCIAL

## 2022-09-24 DIAGNOSIS — E78.5 DYSLIPIDEMIA: ICD-10-CM

## 2022-09-24 DIAGNOSIS — I10 ESSENTIAL HYPERTENSION: ICD-10-CM

## 2022-09-24 LAB
ALBUMIN SERPL BCP-MCNC: 4.5 G/DL (ref 3.2–4.9)
ALBUMIN/GLOB SERPL: 1.7 G/DL
ALP SERPL-CCNC: 56 U/L (ref 30–99)
ALT SERPL-CCNC: 27 U/L (ref 2–50)
ANION GAP SERPL CALC-SCNC: 14 MMOL/L (ref 7–16)
AST SERPL-CCNC: 27 U/L (ref 12–45)
BASOPHILS # BLD AUTO: 0.3 % (ref 0–1.8)
BASOPHILS # BLD: 0.02 K/UL (ref 0–0.12)
BILIRUB SERPL-MCNC: 0.5 MG/DL (ref 0.1–1.5)
BUN SERPL-MCNC: 19 MG/DL (ref 8–22)
CALCIUM SERPL-MCNC: 9.3 MG/DL (ref 8.5–10.5)
CHLORIDE SERPL-SCNC: 103 MMOL/L (ref 96–112)
CHOLEST SERPL-MCNC: 157 MG/DL (ref 100–199)
CO2 SERPL-SCNC: 22 MMOL/L (ref 20–33)
CREAT SERPL-MCNC: 0.92 MG/DL (ref 0.5–1.4)
EOSINOPHIL # BLD AUTO: 0.11 K/UL (ref 0–0.51)
EOSINOPHIL NFR BLD: 1.6 % (ref 0–6.9)
ERYTHROCYTE [DISTWIDTH] IN BLOOD BY AUTOMATED COUNT: 40.5 FL (ref 35.9–50)
FASTING STATUS PATIENT QL REPORTED: NORMAL
GFR SERPLBLD CREATININE-BSD FMLA CKD-EPI: 102 ML/MIN/1.73 M 2
GLOBULIN SER CALC-MCNC: 2.7 G/DL (ref 1.9–3.5)
GLUCOSE SERPL-MCNC: 95 MG/DL (ref 65–99)
HCT VFR BLD AUTO: 45.8 % (ref 42–52)
HDLC SERPL-MCNC: 46 MG/DL
HGB BLD-MCNC: 15.3 G/DL (ref 14–18)
IMM GRANULOCYTES # BLD AUTO: 0.01 K/UL (ref 0–0.11)
IMM GRANULOCYTES NFR BLD AUTO: 0.1 % (ref 0–0.9)
LDLC SERPL CALC-MCNC: 96 MG/DL
LYMPHOCYTES # BLD AUTO: 3.01 K/UL (ref 1–4.8)
LYMPHOCYTES NFR BLD: 44.9 % (ref 22–41)
MCH RBC QN AUTO: 29.1 PG (ref 27–33)
MCHC RBC AUTO-ENTMCNC: 33.4 G/DL (ref 33.7–35.3)
MCV RBC AUTO: 87.2 FL (ref 81.4–97.8)
MONOCYTES # BLD AUTO: 0.51 K/UL (ref 0–0.85)
MONOCYTES NFR BLD AUTO: 7.6 % (ref 0–13.4)
NEUTROPHILS # BLD AUTO: 3.05 K/UL (ref 1.82–7.42)
NEUTROPHILS NFR BLD: 45.5 % (ref 44–72)
NRBC # BLD AUTO: 0 K/UL
NRBC BLD-RTO: 0 /100 WBC
PLATELET # BLD AUTO: 272 K/UL (ref 164–446)
PMV BLD AUTO: 9.4 FL (ref 9–12.9)
POTASSIUM SERPL-SCNC: 3.7 MMOL/L (ref 3.6–5.5)
PROT SERPL-MCNC: 7.2 G/DL (ref 6–8.2)
RBC # BLD AUTO: 5.25 M/UL (ref 4.7–6.1)
SODIUM SERPL-SCNC: 139 MMOL/L (ref 135–145)
TRIGL SERPL-MCNC: 74 MG/DL (ref 0–149)
WBC # BLD AUTO: 6.7 K/UL (ref 4.8–10.8)

## 2022-09-24 PROCEDURE — 85025 COMPLETE CBC W/AUTO DIFF WBC: CPT

## 2022-09-24 PROCEDURE — 80053 COMPREHEN METABOLIC PANEL: CPT

## 2022-09-24 PROCEDURE — 36415 COLL VENOUS BLD VENIPUNCTURE: CPT

## 2022-09-24 PROCEDURE — 80061 LIPID PANEL: CPT

## 2022-10-05 ENCOUNTER — OFFICE VISIT (OUTPATIENT)
Dept: MEDICAL GROUP | Facility: PHYSICIAN GROUP | Age: 47
End: 2022-10-05
Payer: COMMERCIAL

## 2022-10-05 VITALS
SYSTOLIC BLOOD PRESSURE: 116 MMHG | RESPIRATION RATE: 16 BRPM | BODY MASS INDEX: 25.48 KG/M2 | HEART RATE: 60 BPM | HEIGHT: 71 IN | OXYGEN SATURATION: 97 % | DIASTOLIC BLOOD PRESSURE: 72 MMHG | WEIGHT: 182 LBS | TEMPERATURE: 98.5 F

## 2022-10-05 DIAGNOSIS — Z23 NEED FOR VACCINATION: ICD-10-CM

## 2022-10-05 DIAGNOSIS — I10 ESSENTIAL HYPERTENSION: Chronic | ICD-10-CM

## 2022-10-05 DIAGNOSIS — E78.5 DYSLIPIDEMIA: ICD-10-CM

## 2022-10-05 DIAGNOSIS — Z00.00 WELL ADULT EXAM: ICD-10-CM

## 2022-10-05 PROCEDURE — 90471 IMMUNIZATION ADMIN: CPT | Performed by: INTERNAL MEDICINE

## 2022-10-05 PROCEDURE — 99204 OFFICE O/P NEW MOD 45 MIN: CPT | Mod: 25 | Performed by: INTERNAL MEDICINE

## 2022-10-05 PROCEDURE — 90686 IIV4 VACC NO PRSV 0.5 ML IM: CPT | Performed by: INTERNAL MEDICINE

## 2022-10-05 RX ORDER — AMLODIPINE BESYLATE 10 MG/1
10 TABLET ORAL
Qty: 90 TABLET | Refills: 3 | Status: SHIPPED | OUTPATIENT
Start: 2022-10-05 | End: 2023-04-10 | Stop reason: SDUPTHER

## 2022-10-05 SDOH — ECONOMIC STABILITY: HOUSING INSECURITY
IN THE LAST 12 MONTHS, WAS THERE A TIME WHEN YOU DID NOT HAVE A STEADY PLACE TO SLEEP OR SLEPT IN A SHELTER (INCLUDING NOW)?: NO

## 2022-10-05 SDOH — ECONOMIC STABILITY: TRANSPORTATION INSECURITY
IN THE PAST 12 MONTHS, HAS LACK OF RELIABLE TRANSPORTATION KEPT YOU FROM MEDICAL APPOINTMENTS, MEETINGS, WORK OR FROM GETTING THINGS NEEDED FOR DAILY LIVING?: NO

## 2022-10-05 SDOH — ECONOMIC STABILITY: FOOD INSECURITY: WITHIN THE PAST 12 MONTHS, THE FOOD YOU BOUGHT JUST DIDN'T LAST AND YOU DIDN'T HAVE MONEY TO GET MORE.: NEVER TRUE

## 2022-10-05 SDOH — ECONOMIC STABILITY: FOOD INSECURITY: WITHIN THE PAST 12 MONTHS, YOU WORRIED THAT YOUR FOOD WOULD RUN OUT BEFORE YOU GOT MONEY TO BUY MORE.: NEVER TRUE

## 2022-10-05 SDOH — ECONOMIC STABILITY: INCOME INSECURITY: HOW HARD IS IT FOR YOU TO PAY FOR THE VERY BASICS LIKE FOOD, HOUSING, MEDICAL CARE, AND HEATING?: NOT VERY HARD

## 2022-10-05 SDOH — ECONOMIC STABILITY: INCOME INSECURITY: IN THE LAST 12 MONTHS, WAS THERE A TIME WHEN YOU WERE NOT ABLE TO PAY THE MORTGAGE OR RENT ON TIME?: NO

## 2022-10-05 SDOH — ECONOMIC STABILITY: HOUSING INSECURITY: IN THE LAST 12 MONTHS, HOW MANY PLACES HAVE YOU LIVED?: 0

## 2022-10-05 SDOH — ECONOMIC STABILITY: TRANSPORTATION INSECURITY
IN THE PAST 12 MONTHS, HAS LACK OF TRANSPORTATION KEPT YOU FROM MEETINGS, WORK, OR FROM GETTING THINGS NEEDED FOR DAILY LIVING?: NO

## 2022-10-05 SDOH — HEALTH STABILITY: PHYSICAL HEALTH: ON AVERAGE, HOW MANY DAYS PER WEEK DO YOU ENGAGE IN MODERATE TO STRENUOUS EXERCISE (LIKE A BRISK WALK)?: 0 DAYS

## 2022-10-05 SDOH — ECONOMIC STABILITY: TRANSPORTATION INSECURITY
IN THE PAST 12 MONTHS, HAS THE LACK OF TRANSPORTATION KEPT YOU FROM MEDICAL APPOINTMENTS OR FROM GETTING MEDICATIONS?: NO

## 2022-10-05 SDOH — HEALTH STABILITY: MENTAL HEALTH
STRESS IS WHEN SOMEONE FEELS TENSE, NERVOUS, ANXIOUS, OR CAN'T SLEEP AT NIGHT BECAUSE THEIR MIND IS TROUBLED. HOW STRESSED ARE YOU?: NOT AT ALL

## 2022-10-05 SDOH — HEALTH STABILITY: PHYSICAL HEALTH: ON AVERAGE, HOW MANY MINUTES DO YOU ENGAGE IN EXERCISE AT THIS LEVEL?: 0 MIN

## 2022-10-05 ASSESSMENT — SOCIAL DETERMINANTS OF HEALTH (SDOH)
DO YOU BELONG TO ANY CLUBS OR ORGANIZATIONS SUCH AS CHURCH GROUPS UNIONS, FRATERNAL OR ATHLETIC GROUPS, OR SCHOOL GROUPS?: YES
HOW OFTEN DO YOU GET TOGETHER WITH FRIENDS OR RELATIVES?: ONCE A WEEK
HOW OFTEN DO YOU GET TOGETHER WITH FRIENDS OR RELATIVES?: ONCE A WEEK
IN A TYPICAL WEEK, HOW MANY TIMES DO YOU TALK ON THE PHONE WITH FAMILY, FRIENDS, OR NEIGHBORS?: MORE THAN THREE TIMES A WEEK
HOW OFTEN DO YOU ATTENT MEETINGS OF THE CLUB OR ORGANIZATION YOU BELONG TO?: MORE THAN 4 TIMES PER YEAR
HOW OFTEN DO YOU ATTEND CHURCH OR RELIGIOUS SERVICES?: MORE THAN 4 TIMES PER YEAR
HOW HARD IS IT FOR YOU TO PAY FOR THE VERY BASICS LIKE FOOD, HOUSING, MEDICAL CARE, AND HEATING?: NOT VERY HARD
HOW OFTEN DO YOU ATTENT MEETINGS OF THE CLUB OR ORGANIZATION YOU BELONG TO?: MORE THAN 4 TIMES PER YEAR
HOW OFTEN DO YOU HAVE A DRINK CONTAINING ALCOHOL: NEVER
HOW OFTEN DO YOU HAVE SIX OR MORE DRINKS ON ONE OCCASION: NEVER
HOW OFTEN DO YOU ATTEND CHURCH OR RELIGIOUS SERVICES?: MORE THAN 4 TIMES PER YEAR
HOW MANY DRINKS CONTAINING ALCOHOL DO YOU HAVE ON A TYPICAL DAY WHEN YOU ARE DRINKING: PATIENT DOES NOT DRINK
WITHIN THE PAST 12 MONTHS, YOU WORRIED THAT YOUR FOOD WOULD RUN OUT BEFORE YOU GOT THE MONEY TO BUY MORE: NEVER TRUE
IN A TYPICAL WEEK, HOW MANY TIMES DO YOU TALK ON THE PHONE WITH FAMILY, FRIENDS, OR NEIGHBORS?: MORE THAN THREE TIMES A WEEK
DO YOU BELONG TO ANY CLUBS OR ORGANIZATIONS SUCH AS CHURCH GROUPS UNIONS, FRATERNAL OR ATHLETIC GROUPS, OR SCHOOL GROUPS?: YES

## 2022-10-05 ASSESSMENT — LIFESTYLE VARIABLES
HOW OFTEN DO YOU HAVE A DRINK CONTAINING ALCOHOL: NEVER
HOW OFTEN DO YOU HAVE SIX OR MORE DRINKS ON ONE OCCASION: NEVER
AUDIT-C TOTAL SCORE: 0
HOW MANY STANDARD DRINKS CONTAINING ALCOHOL DO YOU HAVE ON A TYPICAL DAY: PATIENT DOES NOT DRINK
SKIP TO QUESTIONS 9-10: 1

## 2022-10-05 ASSESSMENT — FIBROSIS 4 INDEX: FIB4 SCORE: 0.92

## 2022-10-05 NOTE — ASSESSMENT & PLAN NOTE
Chronic stable condition.  The patient presently taking amlodipine.  Blood pressure has been well controlled.

## 2022-10-05 NOTE — ASSESSMENT & PLAN NOTE
Recent blood test showed improvement in the LDL level.  Result discussed with the patient     Latest Reference Range & Units 9/24/22 09:30   WBC 4.8 - 10.8 K/uL 6.7   RBC 4.70 - 6.10 M/uL 5.25   Hemoglobin 14.0 - 18.0 g/dL 15.3   Hematocrit 42.0 - 52.0 % 45.8   MCV 81.4 - 97.8 fL 87.2   MCH 27.0 - 33.0 pg 29.1   MCHC 33.7 - 35.3 g/dL 33.4 (L)   RDW 35.9 - 50.0 fL 40.5   Platelet Count 164 - 446 K/uL 272   MPV 9.0 - 12.9 fL 9.4   Neutrophils-Polys 44.00 - 72.00 % 45.50   Neutrophils (Absolute) 1.82 - 7.42 K/uL 3.05   Lymphocytes 22.00 - 41.00 % 44.90 (H)   Lymphs (Absolute) 1.00 - 4.80 K/uL 3.01   Monocytes 0.00 - 13.40 % 7.60   Monos (Absolute) 0.00 - 0.85 K/uL 0.51   Eosinophils 0.00 - 6.90 % 1.60   Eos (Absolute) 0.00 - 0.51 K/uL 0.11   Basophils 0.00 - 1.80 % 0.30   Baso (Absolute) 0.00 - 0.12 K/uL 0.02   Immature Granulocytes 0.00 - 0.90 % 0.10   Immature Granulocytes (abs) 0.00 - 0.11 K/uL 0.01   Nucleated RBC /100 WBC 0.00   NRBC (Absolute) K/uL 0.00   Sodium 135 - 145 mmol/L 139   Potassium 3.6 - 5.5 mmol/L 3.7   Chloride 96 - 112 mmol/L 103   Co2 20 - 33 mmol/L 22   Anion Gap 7.0 - 16.0  14.0   Glucose 65 - 99 mg/dL 95   Bun 8 - 22 mg/dL 19   Creatinine 0.50 - 1.40 mg/dL 0.92   GFR (CKD-EPI) >60 mL/min/1.73 m 2 102   Calcium 8.5 - 10.5 mg/dL 9.3   AST(SGOT) 12 - 45 U/L 27   ALT(SGPT) 2 - 50 U/L 27   Alkaline Phosphatase 30 - 99 U/L 56   Total Bilirubin 0.1 - 1.5 mg/dL 0.5   Albumin 3.2 - 4.9 g/dL 4.5   Total Protein 6.0 - 8.2 g/dL 7.2   Globulin 1.9 - 3.5 g/dL 2.7   A-G Ratio g/dL 1.7   Fasting Status  Fasting   Cholesterol,Tot 100 - 199 mg/dL 157   Triglycerides 0 - 149 mg/dL 74   HDL >=40 mg/dL 46   LDL <100 mg/dL 96   (L): Data is abnormally low  (H): Data is abnormally high   None

## 2022-10-05 NOTE — PROGRESS NOTES
PRIMARY CARE CLINIC VISIT  Chief Complaint   Patient presents with    New Patient   Establish care  Follow-up hypertension  Test results  Influenza vaccine      History of Present Illness     Essential hypertension  Chronic stable condition.  The patient presently taking amlodipine.  Blood pressure has been well controlled.    Dyslipidemia  Recent blood test showed improvement in the LDL level.  Result discussed with the patient     Latest Reference Range & Units 9/24/22 09:30   WBC 4.8 - 10.8 K/uL 6.7   RBC 4.70 - 6.10 M/uL 5.25   Hemoglobin 14.0 - 18.0 g/dL 15.3   Hematocrit 42.0 - 52.0 % 45.8   MCV 81.4 - 97.8 fL 87.2   MCH 27.0 - 33.0 pg 29.1   MCHC 33.7 - 35.3 g/dL 33.4 (L)   RDW 35.9 - 50.0 fL 40.5   Platelet Count 164 - 446 K/uL 272   MPV 9.0 - 12.9 fL 9.4   Neutrophils-Polys 44.00 - 72.00 % 45.50   Neutrophils (Absolute) 1.82 - 7.42 K/uL 3.05   Lymphocytes 22.00 - 41.00 % 44.90 (H)   Lymphs (Absolute) 1.00 - 4.80 K/uL 3.01   Monocytes 0.00 - 13.40 % 7.60   Monos (Absolute) 0.00 - 0.85 K/uL 0.51   Eosinophils 0.00 - 6.90 % 1.60   Eos (Absolute) 0.00 - 0.51 K/uL 0.11   Basophils 0.00 - 1.80 % 0.30   Baso (Absolute) 0.00 - 0.12 K/uL 0.02   Immature Granulocytes 0.00 - 0.90 % 0.10   Immature Granulocytes (abs) 0.00 - 0.11 K/uL 0.01   Nucleated RBC /100 WBC 0.00   NRBC (Absolute) K/uL 0.00   Sodium 135 - 145 mmol/L 139   Potassium 3.6 - 5.5 mmol/L 3.7   Chloride 96 - 112 mmol/L 103   Co2 20 - 33 mmol/L 22   Anion Gap 7.0 - 16.0  14.0   Glucose 65 - 99 mg/dL 95   Bun 8 - 22 mg/dL 19   Creatinine 0.50 - 1.40 mg/dL 0.92   GFR (CKD-EPI) >60 mL/min/1.73 m 2 102   Calcium 8.5 - 10.5 mg/dL 9.3   AST(SGOT) 12 - 45 U/L 27   ALT(SGPT) 2 - 50 U/L 27   Alkaline Phosphatase 30 - 99 U/L 56   Total Bilirubin 0.1 - 1.5 mg/dL 0.5   Albumin 3.2 - 4.9 g/dL 4.5   Total Protein 6.0 - 8.2 g/dL 7.2   Globulin 1.9 - 3.5 g/dL 2.7   A-G Ratio g/dL 1.7   Fasting Status  Fasting   Cholesterol,Tot 100 - 199 mg/dL 157   Triglycerides 0 - 149  "mg/dL 74   HDL >=40 mg/dL 46   LDL <100 mg/dL 96   (L): Data is abnormally low  (H): Data is abnormally high  Current Outpatient Medications on File Prior to Visit   Medication Sig Dispense Refill    amLODIPine (NORVASC) 10 MG Tab TAKE 1 TABLET BY MOUTH EVERY DAY 90 Tablet 1     No current facility-administered medications on file prior to visit.        Allergies: Patient has no known allergies.    ROS  As per HPI above. All other systems reviewed and negative.      Past Medical, Social, and Family history reviewed and updated in EPIC     Objective     /72 (BP Location: Left arm, Patient Position: Sitting, BP Cuff Size: Adult)   Pulse 60   Temp 36.9 °C (98.5 °F) (Temporal)   Resp 16   Ht 1.803 m (5' 11\")   Wt 82.6 kg (182 lb)   SpO2 97%    Body mass index is 25.38 kg/m².    General: alert in no apparent distress.  Cardiovascular: regular rate and rhythm  Pulmonary: lungs : no wheezing   Gastrointestinal: BS present. No obvious mass noted          Assessment and Plan     1. Essential hypertension  Chronic stable condition.  Continue amlodipine.  2. Dyslipidemia  Lipid panel improved.  Lab test result discussed with the patient.  Continue with low-fat low-cholesterol diet.  Continue to monitor.  3. Need for vaccination  Influenza ordered today.                    Healthcare Maintenance     Health Maintenance Due   Topic Date Due    IMM HEP B VACCINE (1 of 3 - 3-dose series) Never done    COVID-19 Vaccine (3 - Booster for Pfizer series) 09/07/2021    IMM INFLUENZA (1) 09/01/2022               Please note that this dictation was created using voice recognition software. I have made every reasonable attempt to correct obvious errors, but I expect that there are errors of grammar and possibly content that I did not discover before finalizing the note.    Gerardo Thibodeaux MD  Internal Medicine  [facility]  "

## 2022-12-31 ENCOUNTER — OFFICE VISIT (OUTPATIENT)
Dept: URGENT CARE | Facility: PHYSICIAN GROUP | Age: 47
End: 2022-12-31
Payer: COMMERCIAL

## 2022-12-31 VITALS
WEIGHT: 184 LBS | HEART RATE: 82 BPM | OXYGEN SATURATION: 98 % | RESPIRATION RATE: 16 BRPM | SYSTOLIC BLOOD PRESSURE: 120 MMHG | BODY MASS INDEX: 25.76 KG/M2 | DIASTOLIC BLOOD PRESSURE: 72 MMHG | TEMPERATURE: 97.7 F | HEIGHT: 71 IN

## 2022-12-31 DIAGNOSIS — J02.9 SORE THROAT: ICD-10-CM

## 2022-12-31 DIAGNOSIS — J03.90 ACUTE TONSILLITIS, UNSPECIFIED ETIOLOGY: ICD-10-CM

## 2022-12-31 LAB
HETEROPH AB SER QL LA: NORMAL
INT CON NEG: NORMAL
INT CON NEG: NORMAL
INT CON POS: NORMAL
INT CON POS: NORMAL
S PYO AG THROAT QL: NORMAL

## 2022-12-31 PROCEDURE — 99214 OFFICE O/P EST MOD 30 MIN: CPT | Performed by: NURSE PRACTITIONER

## 2022-12-31 PROCEDURE — 87880 STREP A ASSAY W/OPTIC: CPT | Performed by: NURSE PRACTITIONER

## 2022-12-31 PROCEDURE — 86308 HETEROPHILE ANTIBODY SCREEN: CPT | Performed by: NURSE PRACTITIONER

## 2022-12-31 RX ORDER — AMOXICILLIN 500 MG/1
500 CAPSULE ORAL 2 TIMES DAILY
Qty: 20 CAPSULE | Refills: 0 | Status: SHIPPED | OUTPATIENT
Start: 2022-12-31 | End: 2023-01-10

## 2022-12-31 ASSESSMENT — FIBROSIS 4 INDEX: FIB4 SCORE: 0.92

## 2022-12-31 NOTE — PROGRESS NOTES
"Bertrand Platt is a 47 y.o. male who presents for Pharyngitis (X 3 days.)      HPI  This is a new problem. Bertrand Platt is a 47 y.o. patient who presents to urgent care with c/o: sore throat with enlarged tonsils for 3 days. Has felt subjective elevated temperatures.  He denies ear pain, cough, runny nose.  He has had tonsillitis in the past.  Treatments tried: Increase fluids, Tylenol, ibuprofen, hot tea with honey  No other aggravating or alleviating factors.       ROS See HPI    Allergies:     No Known Allergies    PMSFS Hx:  Past Medical History:   Diagnosis Date    HTN (hypertension)     Hypertension      History reviewed. No pertinent surgical history.  Family History   Problem Relation Age of Onset    Hyperlipidemia Mother     Hypertension Mother     Diabetes Mother      Social History     Tobacco Use    Smoking status: Former     Years: 4.00     Types: Cigarettes     Quit date: 2002     Years since quittin.5    Smokeless tobacco: Never    Tobacco comments:     smoked 3 cig/day, quit in    Substance Use Topics    Alcohol use: No     Alcohol/week: 0.0 oz       Problems:   Patient Active Problem List   Diagnosis    Dyslipidemia    Essential hypertension    Bilateral bunions       Medications:   Current Outpatient Medications on File Prior to Visit   Medication Sig Dispense Refill    amLODIPine (NORVASC) 10 MG Tab Take 1 Tablet by mouth every day. 90 Tablet 3     No current facility-administered medications on file prior to visit.          Objective:     /72   Pulse 82   Temp 36.5 °C (97.7 °F) (Temporal)   Resp 16   Ht 1.803 m (5' 11\")   Wt 83.5 kg (184 lb)   SpO2 98%   BMI 25.66 kg/m²     Physical Exam  Vitals and nursing note reviewed.   Constitutional:       General: He is not in acute distress.     Appearance: He is well-developed. He is not ill-appearing.   HENT:      Head: Normocephalic.      Right Ear: Tympanic membrane, ear canal and external ear normal.      Left Ear: " Tympanic membrane, ear canal and external ear normal.      Nose: No rhinorrhea.      Mouth/Throat:      Mouth: Mucous membranes are moist.      Pharynx: Uvula midline. No uvula swelling.      Tonsils: Tonsillar exudate present. No tonsillar abscesses. 3+ on the right. 3+ on the left.   Eyes:      Conjunctiva/sclera: Conjunctivae normal.      Pupils: Pupils are equal, round, and reactive to light.   Cardiovascular:      Rate and Rhythm: Normal rate and regular rhythm.      Pulses: Normal pulses.      Heart sounds: Normal heart sounds.   Pulmonary:      Effort: Pulmonary effort is normal.      Breath sounds: Normal breath sounds.   Musculoskeletal:      Cervical back: Normal range of motion and neck supple.   Lymphadenopathy:      Head:      Right side of head: No tonsillar adenopathy.      Left side of head: No tonsillar adenopathy.      Cervical: Cervical adenopathy present.      Right cervical: Superficial cervical adenopathy present.      Left cervical: Superficial cervical adenopathy present.      Upper Body:      Right upper body: No supraclavicular adenopathy.      Left upper body: No supraclavicular adenopathy.   Skin:     General: Skin is warm and dry.      Capillary Refill: Capillary refill takes less than 2 seconds.   Neurological:      Mental Status: He is alert and oriented to person, place, and time.   Psychiatric:         Mood and Affect: Mood normal.         Speech: Speech normal.         Behavior: Behavior normal. Behavior is cooperative.         Thought Content: Thought content normal.     Results for orders placed or performed in visit on 12/31/22   POCT Mononucleosis (mono)   Result Value Ref Range    Heterophile Screen NEG     Internal Control Positive Valid     Internal Control Negative Valid      Rapid Strep: negative     Assessment /Associated Orders:      1. Acute tonsillitis, unspecified etiology  POCT Mononucleosis (mono)    amoxicillin (AMOXIL) 500 MG Cap      2. Sore throat  POCT Rapid  Strep A          Medical Decision Making:    Pt is clinically stable at today's acute urgent care visit.  No acute distress noted. Appropriate for outpatient care at this time.   Acute problem today .   Educated in proper administration of  prescription medication(s) ordered today including safety, possible SE, risks, benefits, rationale and alternatives to therapy.   Keep well hydrated  Discussed Dx, management options (risks,benefits, and alternatives to planned treatment), natural progression and supportive care.  Expressed understanding and the treatment plan was agreed upon.   Questions were encouraged and answered   Return to urgent care prn if new or worsening sx or if there is no improvement in condition prn.    \      Time I spent evaluating Bertrand Platt in urgent care today was 32  minutes. This time includes preparing for visit, reviewing any pertinent notes or test results, counseling/education, exam, obtaining HPI, interpretation of lab tests, medication management and documentation as indicated above.Time does not include separately billable procedures noted .       Please note that this dictation was created using voice recognition software. I have worked with consultants from the vendor as well as technical experts from [facility] to optimize the interface. I have made every reasonable attempt to correct obvious errors, but I expect that there are errors of grammar and possibly content that I did not discover before finalizing the note.  This note was electronically signed by provider

## 2023-04-01 ENCOUNTER — HOSPITAL ENCOUNTER (OUTPATIENT)
Dept: LAB | Facility: MEDICAL CENTER | Age: 48
End: 2023-04-01
Attending: INTERNAL MEDICINE
Payer: COMMERCIAL

## 2023-04-01 DIAGNOSIS — Z00.00 WELL ADULT EXAM: ICD-10-CM

## 2023-04-01 LAB
ANION GAP SERPL CALC-SCNC: 14 MMOL/L (ref 7–16)
BUN SERPL-MCNC: 17 MG/DL (ref 8–22)
CALCIUM SERPL-MCNC: 9.2 MG/DL (ref 8.5–10.5)
CHLORIDE SERPL-SCNC: 105 MMOL/L (ref 96–112)
CHOLEST SERPL-MCNC: 184 MG/DL (ref 100–199)
CO2 SERPL-SCNC: 23 MMOL/L (ref 20–33)
CREAT SERPL-MCNC: 0.9 MG/DL (ref 0.5–1.4)
CREAT UR-MCNC: 125.67 MG/DL
EST. AVERAGE GLUCOSE BLD GHB EST-MCNC: 123 MG/DL
FASTING STATUS PATIENT QL REPORTED: NORMAL
GFR SERPLBLD CREATININE-BSD FMLA CKD-EPI: 105 ML/MIN/1.73 M 2
GLUCOSE SERPL-MCNC: 92 MG/DL (ref 65–99)
HBA1C MFR BLD: 5.9 % (ref 4–5.6)
HDLC SERPL-MCNC: 49 MG/DL
LDLC SERPL CALC-MCNC: 118 MG/DL
MICROALBUMIN UR-MCNC: 2.3 MG/DL
MICROALBUMIN/CREAT UR: 18 MG/G (ref 0–30)
POTASSIUM SERPL-SCNC: 3.7 MMOL/L (ref 3.6–5.5)
PSA SERPL-MCNC: 1.48 NG/ML (ref 0–4)
SODIUM SERPL-SCNC: 142 MMOL/L (ref 135–145)
TRIGL SERPL-MCNC: 85 MG/DL (ref 0–149)

## 2023-04-01 PROCEDURE — 80061 LIPID PANEL: CPT

## 2023-04-01 PROCEDURE — 82043 UR ALBUMIN QUANTITATIVE: CPT

## 2023-04-01 PROCEDURE — 80048 BASIC METABOLIC PNL TOTAL CA: CPT

## 2023-04-01 PROCEDURE — 82570 ASSAY OF URINE CREATININE: CPT

## 2023-04-01 PROCEDURE — 83036 HEMOGLOBIN GLYCOSYLATED A1C: CPT

## 2023-04-01 PROCEDURE — 36415 COLL VENOUS BLD VENIPUNCTURE: CPT

## 2023-04-01 PROCEDURE — 84153 ASSAY OF PSA TOTAL: CPT

## 2023-04-03 PROBLEM — R73.03 PREDIABETES: Status: ACTIVE | Noted: 2023-04-03

## 2023-04-03 PROBLEM — R73.03 PREDIABETES: Chronic | Status: ACTIVE | Noted: 2023-04-03

## 2023-04-10 ENCOUNTER — OFFICE VISIT (OUTPATIENT)
Dept: MEDICAL GROUP | Facility: PHYSICIAN GROUP | Age: 48
End: 2023-04-10
Payer: COMMERCIAL

## 2023-04-10 VITALS
DIASTOLIC BLOOD PRESSURE: 68 MMHG | OXYGEN SATURATION: 96 % | HEIGHT: 71 IN | RESPIRATION RATE: 16 BRPM | TEMPERATURE: 98.4 F | WEIGHT: 186.2 LBS | SYSTOLIC BLOOD PRESSURE: 114 MMHG | HEART RATE: 64 BPM | BODY MASS INDEX: 26.07 KG/M2

## 2023-04-10 DIAGNOSIS — Z23 NEED FOR VACCINATION: ICD-10-CM

## 2023-04-10 DIAGNOSIS — E78.5 DYSLIPIDEMIA: Chronic | ICD-10-CM

## 2023-04-10 DIAGNOSIS — Z12.11 COLON CANCER SCREENING: ICD-10-CM

## 2023-04-10 DIAGNOSIS — I10 ESSENTIAL HYPERTENSION: Chronic | ICD-10-CM

## 2023-04-10 DIAGNOSIS — R73.03 PREDIABETES: Chronic | ICD-10-CM

## 2023-04-10 DIAGNOSIS — Z11.59 NEED FOR HEPATITIS C SCREENING TEST: ICD-10-CM

## 2023-04-10 PROCEDURE — 99214 OFFICE O/P EST MOD 30 MIN: CPT | Mod: 25 | Performed by: INTERNAL MEDICINE

## 2023-04-10 PROCEDURE — 90746 HEPB VACCINE 3 DOSE ADULT IM: CPT | Performed by: INTERNAL MEDICINE

## 2023-04-10 PROCEDURE — 90471 IMMUNIZATION ADMIN: CPT | Performed by: INTERNAL MEDICINE

## 2023-04-10 RX ORDER — AMLODIPINE BESYLATE 10 MG/1
10 TABLET ORAL
Qty: 90 TABLET | Refills: 3 | Status: SHIPPED | OUTPATIENT
Start: 2023-04-10

## 2023-04-10 ASSESSMENT — PATIENT HEALTH QUESTIONNAIRE - PHQ9: CLINICAL INTERPRETATION OF PHQ2 SCORE: 0

## 2023-04-10 ASSESSMENT — FIBROSIS 4 INDEX: FIB4 SCORE: 0.94

## 2023-04-10 NOTE — PROGRESS NOTES
PRIMARY CARE CLINIC VISIT    Chief complaint:    Follow-up hypertension  Prediabetes  Hyperlipidemia  Hepatitis B vaccine  Colonoscopy request      History of Present Illness     Dyslipidemia  This is a new condition noted with recent lab test.  Result discussed with the patient.  Patient currently not on therapy.    Essential hypertension  Chronic ongoing condition.  The patient is now taking amlodipine 10 mg daily.  Patient is requesting prescription refill.  Blood pressure has been well controlled.  Patient tolerating medication well.  No significant side effects reported.    Prediabetes  Chronic condition.  Patient currently on diet therapy.    Need for vaccination  Patient is due for hepatitis B vaccine which has been ordered today.    No current outpatient medications on file prior to visit.     No current facility-administered medications on file prior to visit.        Allergies: Patient has no known allergies.    Current Outpatient Medications Ordered in Epic   Medication Sig Dispense Refill    amLODIPine (NORVASC) 10 MG Tab Take 1 Tablet by mouth every day. 90 Tablet 3     No current Epic-ordered facility-administered medications on file.       Past Medical History:   Diagnosis Date    HTN (hypertension)     Hypertension        History reviewed. No pertinent surgical history.    Family History   Problem Relation Age of Onset    Hyperlipidemia Mother     Hypertension Mother     Diabetes Mother        Social History     Tobacco Use   Smoking Status Former    Years: 4.00    Types: Cigarettes    Quit date: 2002    Years since quittin.8   Smokeless Tobacco Never   Tobacco Comments    smoked 3 cig/day, quit in        Social History     Substance and Sexual Activity   Alcohol Use No    Alcohol/week: 0.0 oz       Review of systems.  As per HPI above. All other systems reviewed and negative.      Past Medical, Social, and Family history reviewed and updated in EPIC     Objective     /68 (BP  "Location: Right arm, Patient Position: Sitting, BP Cuff Size: Adult)   Pulse 64   Temp 36.9 °C (98.4 °F) (Temporal)   Resp 16   Ht 1.803 m (5' 11\")   Wt 84.5 kg (186 lb 3.2 oz)   SpO2 96%    Body mass index is 25.97 kg/m².    General: alert in no apparent distress.  Cardiovascular: regular rate and rhythm  Pulmonary: lungs : no wheezing   Gastrointestinal: BS present. No obvious mass noted        Lab Results   Component Value Date/Time    HBA1C 5.9 (H) 04/01/2023 11:08 AM       Lab Results   Component Value Date/Time    WBC 6.7 09/24/2022 09:30 AM    HEMOGLOBIN 15.3 09/24/2022 09:30 AM    HEMATOCRIT 45.8 09/24/2022 09:30 AM    MCV 87.2 09/24/2022 09:30 AM    PLATELETCT 272 09/24/2022 09:30 AM         Lab Results   Component Value Date/Time    SODIUM 142 04/01/2023 11:08 AM    POTASSIUM 3.7 04/01/2023 11:08 AM    GLUCOSE 92 04/01/2023 11:08 AM    BUN 17 04/01/2023 11:08 AM    CREATININE 0.90 04/01/2023 11:08 AM       Lab Results   Component Value Date/Time    CHOLSTRLTOT 184 04/01/2023 11:08 AM     (H) 04/01/2023 11:08 AM    HDL 49 04/01/2023 11:08 AM    TRIGLYCERIDE 85 04/01/2023 11:08 AM       Lab Results   Component Value Date/Time    ALTSGPT 27 09/24/2022 09:30 AM             Assessment and Plan     1. Essential hypertension  - amLODIPine (NORVASC) 10 MG Tab; Take 1 Tablet by mouth every day.  Dispense: 90 Tablet; Refill: 3  Chronic stable condition.  Continue amlodipine 10 mg daily.  Continue to monitor blood pressure regular basis.    2. Prediabetes  - HEMOGLOBIN A1C; Future  - Basic Metabolic Panel; Future  Chronic condition.  Advised the patient regarding diet and exercise.  Patient not interested in taking medication at this time.  Continue to monitor    3. Dyslipidemia  - ALANINE AMINO-TRANS; Future  - Lipid Profile; Future  This is a new condition noted with recent lab test.  Patient not interested in taking medication.  Recommend diet and exercise follow-up in 4 months with labs prior.    4. " Colon cancer screening  - Referral to GI for Colonoscopy    5. Need for vaccination  - Hepatitis B Vaccine Adult IM    6. Need for hepatitis C screening test  - HEP C VIRUS ANTIBODY; Future                    Please note that this dictation was created using voice recognition software. I have made every reasonable attempt to correct obvious errors, but I expect that there are errors of grammar and possibly content that I did not discover before finalizing the note.    Gerardo Thibodeaux MD  Internal Medicine  [facility]

## 2023-04-10 NOTE — ASSESSMENT & PLAN NOTE
This is a new condition noted with recent lab test.  Result discussed with the patient.  Patient currently not on therapy.

## 2023-04-10 NOTE — ASSESSMENT & PLAN NOTE
Chronic ongoing condition.  The patient is now taking amlodipine 10 mg daily.  Patient is requesting prescription refill.  Blood pressure has been well controlled.  Patient tolerating medication well.  No significant side effects reported.

## 2023-05-15 ENCOUNTER — NON-PROVIDER VISIT (OUTPATIENT)
Dept: MEDICAL GROUP | Facility: PHYSICIAN GROUP | Age: 48
End: 2023-05-15
Payer: COMMERCIAL

## 2023-05-15 DIAGNOSIS — Z23 NEED FOR VACCINATION: ICD-10-CM

## 2023-05-15 PROCEDURE — 90746 HEPB VACCINE 3 DOSE ADULT IM: CPT | Performed by: INTERNAL MEDICINE

## 2023-05-15 PROCEDURE — 1126F AMNT PAIN NOTED NONE PRSNT: CPT | Performed by: INTERNAL MEDICINE

## 2023-05-15 PROCEDURE — 90471 IMMUNIZATION ADMIN: CPT | Performed by: INTERNAL MEDICINE

## 2023-05-15 NOTE — PROGRESS NOTES
"Bertrand Platt is a 48 y.o. male here for a non-provider visit for:   HEPATITIS B 2 of 3    Reason for immunization: continue or complete series started at the office  Immunization records indicate need for vaccine: Yes, confirmed with Epic  Minimum interval has been met for this vaccine: No  ABN completed: No    VIS Dated  10/15/2021 was given to patient: Yes  All IAC Questionnaire questions were answered \"No.\"    Patient tolerated injection and no adverse effects were observed or reported: Yes    Pt scheduled for next dose in series: Yes    "

## 2023-08-07 ENCOUNTER — HOSPITAL ENCOUNTER (OUTPATIENT)
Dept: LAB | Facility: MEDICAL CENTER | Age: 48
End: 2023-08-07
Attending: INTERNAL MEDICINE
Payer: COMMERCIAL

## 2023-08-07 DIAGNOSIS — Z11.59 NEED FOR HEPATITIS C SCREENING TEST: ICD-10-CM

## 2023-08-07 DIAGNOSIS — E78.5 DYSLIPIDEMIA: Chronic | ICD-10-CM

## 2023-08-07 DIAGNOSIS — R73.03 PREDIABETES: Chronic | ICD-10-CM

## 2023-08-07 LAB
ALT SERPL-CCNC: 33 U/L (ref 2–50)
ANION GAP SERPL CALC-SCNC: 10 MMOL/L (ref 7–16)
BUN SERPL-MCNC: 16 MG/DL (ref 8–22)
CALCIUM SERPL-MCNC: 9.3 MG/DL (ref 8.5–10.5)
CHLORIDE SERPL-SCNC: 105 MMOL/L (ref 96–112)
CHOLEST SERPL-MCNC: 129 MG/DL (ref 100–199)
CO2 SERPL-SCNC: 25 MMOL/L (ref 20–33)
CREAT SERPL-MCNC: 0.93 MG/DL (ref 0.5–1.4)
EST. AVERAGE GLUCOSE BLD GHB EST-MCNC: 117 MG/DL
FASTING STATUS PATIENT QL REPORTED: NORMAL
GFR SERPLBLD CREATININE-BSD FMLA CKD-EPI: 100 ML/MIN/1.73 M 2
GLUCOSE SERPL-MCNC: 105 MG/DL (ref 65–99)
HBA1C MFR BLD: 5.7 % (ref 4–5.6)
HCV AB SER QL: NORMAL
HDLC SERPL-MCNC: 39 MG/DL
LDLC SERPL CALC-MCNC: 71 MG/DL
POTASSIUM SERPL-SCNC: 4.1 MMOL/L (ref 3.6–5.5)
SODIUM SERPL-SCNC: 140 MMOL/L (ref 135–145)
TRIGL SERPL-MCNC: 94 MG/DL (ref 0–149)

## 2023-08-07 PROCEDURE — 80061 LIPID PANEL: CPT

## 2023-08-07 PROCEDURE — 83036 HEMOGLOBIN GLYCOSYLATED A1C: CPT

## 2023-08-07 PROCEDURE — 84460 ALANINE AMINO (ALT) (SGPT): CPT

## 2023-08-07 PROCEDURE — 86803 HEPATITIS C AB TEST: CPT

## 2023-08-07 PROCEDURE — 36415 COLL VENOUS BLD VENIPUNCTURE: CPT

## 2023-08-07 PROCEDURE — 80048 BASIC METABOLIC PNL TOTAL CA: CPT

## 2023-08-14 ENCOUNTER — OFFICE VISIT (OUTPATIENT)
Dept: MEDICAL GROUP | Facility: PHYSICIAN GROUP | Age: 48
End: 2023-08-14
Payer: COMMERCIAL

## 2023-08-14 VITALS
WEIGHT: 181.25 LBS | HEART RATE: 72 BPM | TEMPERATURE: 98.1 F | DIASTOLIC BLOOD PRESSURE: 66 MMHG | HEIGHT: 71 IN | RESPIRATION RATE: 20 BRPM | BODY MASS INDEX: 25.37 KG/M2 | SYSTOLIC BLOOD PRESSURE: 114 MMHG | OXYGEN SATURATION: 98 %

## 2023-08-14 DIAGNOSIS — I10 ESSENTIAL HYPERTENSION: Chronic | ICD-10-CM

## 2023-08-14 DIAGNOSIS — R73.03 PREDIABETES: Chronic | ICD-10-CM

## 2023-08-14 DIAGNOSIS — K63.5 POLYP OF COLON, UNSPECIFIED PART OF COLON, UNSPECIFIED TYPE: ICD-10-CM

## 2023-08-14 DIAGNOSIS — E78.5 DYSLIPIDEMIA: Chronic | ICD-10-CM

## 2023-08-14 PROCEDURE — 3074F SYST BP LT 130 MM HG: CPT | Performed by: INTERNAL MEDICINE

## 2023-08-14 PROCEDURE — 99214 OFFICE O/P EST MOD 30 MIN: CPT | Performed by: INTERNAL MEDICINE

## 2023-08-14 PROCEDURE — 3078F DIAST BP <80 MM HG: CPT | Performed by: INTERNAL MEDICINE

## 2023-08-14 ASSESSMENT — FIBROSIS 4 INDEX: FIB4 SCORE: 0.85

## 2023-08-14 NOTE — ASSESSMENT & PLAN NOTE
Chronic ongoing condition.  The patient is taking amlodipine 10 mg daily.  Blood pressure has been well controlled.  Patient tolerating medication well.

## 2023-08-14 NOTE — ASSESSMENT & PLAN NOTE
Chronic condition.  Patient had colonoscopy July 2023.  Recommend to repeat the colonoscopy in 3 to 5 years.  Patient denies nausea vomiting abdominal pain GI bleeding or unexplained weight loss.

## 2023-08-14 NOTE — ASSESSMENT & PLAN NOTE
This is a new condition noted with chart review.  Lab test report discussed with the patient.  The patient stated that his mother was diagnosed with diabetes.

## 2023-08-14 NOTE — ASSESSMENT & PLAN NOTE
This is a chronic condition.  The patient is presently on diet therapy.  Recent lipid panel result discussed with the patient

## 2023-08-14 NOTE — PROGRESS NOTES
PRIMARY CARE CLINIC VISIT    Chief complaint:    Follow-up hypertension  Hyperlipidemia  Lab test results  Prediabetes  Colonic polyp      History of Present Illness     Dyslipidemia  This is a chronic condition.  The patient is presently on diet therapy.  Recent lipid panel result discussed with the patient    Essential hypertension  Chronic ongoing condition.  The patient is taking amlodipine 10 mg daily.  Blood pressure has been well controlled.  Patient tolerating medication well.    Prediabetes  This is a new condition noted with chart review.  Lab test report discussed with the patient.  The patient stated that his mother was diagnosed with diabetes.    Colon polyps  Chronic condition.  Patient had colonoscopy 2023.  Recommend to repeat the colonoscopy in 3 to 5 years.  Patient denies nausea vomiting abdominal pain GI bleeding or unexplained weight loss.    Current Outpatient Medications on File Prior to Visit   Medication Sig Dispense Refill    amLODIPine (NORVASC) 10 MG Tab Take 1 Tablet by mouth every day. 90 Tablet 3     No current facility-administered medications on file prior to visit.        Allergies: Patient has no known allergies.    Current Outpatient Medications Ordered in Epic   Medication Sig Dispense Refill    amLODIPine (NORVASC) 10 MG Tab Take 1 Tablet by mouth every day. 90 Tablet 3     No current Epic-ordered facility-administered medications on file.       Past Medical History:   Diagnosis Date    HTN (hypertension)     Hypertension        History reviewed. No pertinent surgical history.    Family History   Problem Relation Age of Onset    Hyperlipidemia Mother     Hypertension Mother     Diabetes Mother        Social History     Tobacco Use   Smoking Status Former    Years: 4.00    Types: Cigarettes    Quit date: 2002    Years since quittin.1   Smokeless Tobacco Never   Tobacco Comments    smoked 3 cig/day, quit in        Social History     Substance and Sexual Activity  "  Alcohol Use No    Alcohol/week: 0.0 oz       Review of systems.  As per HPI above. All other systems reviewed and negative.      Past Medical, Social, and Family history reviewed and updated in EPIC     Objective     /66 (BP Location: Left arm, Patient Position: Sitting, BP Cuff Size: Adult)   Pulse 72   Temp 36.7 °C (98.1 °F) (Temporal)   Resp 20   Ht 1.803 m (5' 11\")   Wt 82.2 kg (181 lb 4 oz)   SpO2 98%    Body mass index is 25.28 kg/m².    General: alert in no apparent distress.  Cardiovascular: regular rate and rhythm  Pulmonary: lungs : no wheezing   Gastrointestinal: BS present. No obvious mass noted          Lab Results   Component Value Date/Time    WBC 6.7 09/24/2022 09:30 AM    HEMOGLOBIN 15.3 09/24/2022 09:30 AM    HEMATOCRIT 45.8 09/24/2022 09:30 AM    MCV 87.2 09/24/2022 09:30 AM    PLATELETCT 272 09/24/2022 09:30 AM         Lab Results   Component Value Date/Time    SODIUM 140 08/07/2023 11:42 AM    POTASSIUM 4.1 08/07/2023 11:42 AM    GLUCOSE 105 (H) 08/07/2023 11:42 AM    BUN 16 08/07/2023 11:42 AM    CREATININE 0.93 08/07/2023 11:42 AM       Lab Results   Component Value Date/Time    CHOLSTRLTOT 129 08/07/2023 11:42 AM    TRIGLYCERIDE 94 08/07/2023 11:42 AM    HDL 39 (A) 08/07/2023 11:42 AM    LDL 71 08/07/2023 11:42 AM       Lab Results   Component Value Date/Time    ALTSGPT 33 08/07/2023 11:42 AM             Assessment and Plan     1. Dyslipidemia  Chronic condition.  Stable.  Recommend diet and exercise.  Continue to monitor.  Repeat lab test next lab draw.  - Lipid Profile; Future    2. Essential hypertension  Chronic stable condition.  Continue amlodipine 10 mg daily.  Continue to monitor blood pressure regular basis at home.    3. Prediabetes  This is a new condition.  Lab test result discussed with the patient.  Today I did discussed with the patient regarding medication treatment such as metformin.  However the patient not presented in taking medication.  Continue with diet " exercise.  Continue to monitor.  - HEMOGLOBIN A1C; Future  - Basic Metabolic Panel; Future    4. Polyp of colon, unspecified part of colon, unspecified type  Chronic condition.  Colonoscopy result discussed with the patient.  Recommend to repeat colonoscopy in 3 to 5 years.  Continue to monitor.      Attestation: I spent:   39  min -  That includes time for chart review before the visit, the actual patient visit, and time spent on documentation in EMR after the visit.  Chart review/prep, review of other providers' records, imaging/lab review, face-to-face time for history/examination, ordering, prescribing,  review of results/meds/ treatment plan with patient, and care coordination.               Healthcare Maintenance       There are no preventive care reminders to display for this patient.            Please note that this dictation was created using voice recognition software. I have made every reasonable attempt to correct obvious errors, but I expect that there are errors of grammar and possibly content that I did not discover before finalizing the note.    Gerardo Thibodeaux MD  Internal Medicine  [facility]

## 2024-05-22 DIAGNOSIS — I10 ESSENTIAL HYPERTENSION: Chronic | ICD-10-CM

## 2024-05-22 RX ORDER — AMLODIPINE BESYLATE 10 MG/1
10 TABLET ORAL
Qty: 90 TABLET | Refills: 0 | Status: SHIPPED | OUTPATIENT
Start: 2024-05-22

## 2024-05-22 NOTE — TELEPHONE ENCOUNTER
Received request via: Pharmacy    Was the patient seen in the last year in this department? Yes    Does the patient have an active prescription (recently filled or refills available) for medication(s) requested? No    Pharm[facility] Name[facility]     Does the patient have retirement Plus and need 100 day supply (blood pressure, diabetes and cholesterol meds only)? Patient does not have SCP

## 2024-07-05 ENCOUNTER — HOSPITAL ENCOUNTER (OUTPATIENT)
Dept: LAB | Facility: MEDICAL CENTER | Age: 49
End: 2024-07-05
Attending: INTERNAL MEDICINE
Payer: COMMERCIAL

## 2024-07-05 DIAGNOSIS — R73.03 PREDIABETES: Chronic | ICD-10-CM

## 2024-07-05 DIAGNOSIS — E78.5 DYSLIPIDEMIA: Chronic | ICD-10-CM

## 2024-07-05 LAB
ANION GAP SERPL CALC-SCNC: 15 MMOL/L (ref 7–16)
BUN SERPL-MCNC: 19 MG/DL (ref 8–22)
CALCIUM SERPL-MCNC: 9.3 MG/DL (ref 8.5–10.5)
CHLORIDE SERPL-SCNC: 99 MMOL/L (ref 96–112)
CHOLEST SERPL-MCNC: 168 MG/DL (ref 100–199)
CO2 SERPL-SCNC: 22 MMOL/L (ref 20–33)
CREAT SERPL-MCNC: 0.93 MG/DL (ref 0.5–1.4)
EST. AVERAGE GLUCOSE BLD GHB EST-MCNC: 117 MG/DL
FASTING STATUS PATIENT QL REPORTED: NORMAL
GFR SERPLBLD CREATININE-BSD FMLA CKD-EPI: 100 ML/MIN/1.73 M 2
GLUCOSE SERPL-MCNC: 89 MG/DL (ref 65–99)
HBA1C MFR BLD: 5.7 % (ref 4–5.6)
HDLC SERPL-MCNC: 40 MG/DL
LDLC SERPL CALC-MCNC: 95 MG/DL
POTASSIUM SERPL-SCNC: 3.9 MMOL/L (ref 3.6–5.5)
SODIUM SERPL-SCNC: 136 MMOL/L (ref 135–145)
TRIGL SERPL-MCNC: 165 MG/DL (ref 0–149)

## 2024-07-05 PROCEDURE — 80061 LIPID PANEL: CPT

## 2024-07-05 PROCEDURE — 83036 HEMOGLOBIN GLYCOSYLATED A1C: CPT

## 2024-07-05 PROCEDURE — 80048 BASIC METABOLIC PNL TOTAL CA: CPT

## 2024-07-05 PROCEDURE — 36415 COLL VENOUS BLD VENIPUNCTURE: CPT

## 2024-07-13 SDOH — ECONOMIC STABILITY: FOOD INSECURITY: WITHIN THE PAST 12 MONTHS, YOU WORRIED THAT YOUR FOOD WOULD RUN OUT BEFORE YOU GOT MONEY TO BUY MORE.: NEVER TRUE

## 2024-07-13 SDOH — HEALTH STABILITY: PHYSICAL HEALTH: ON AVERAGE, HOW MANY MINUTES DO YOU ENGAGE IN EXERCISE AT THIS LEVEL?: 20 MIN

## 2024-07-13 SDOH — HEALTH STABILITY: MENTAL HEALTH
STRESS IS WHEN SOMEONE FEELS TENSE, NERVOUS, ANXIOUS, OR CAN'T SLEEP AT NIGHT BECAUSE THEIR MIND IS TROUBLED. HOW STRESSED ARE YOU?: ONLY A LITTLE

## 2024-07-13 SDOH — ECONOMIC STABILITY: INCOME INSECURITY: HOW HARD IS IT FOR YOU TO PAY FOR THE VERY BASICS LIKE FOOD, HOUSING, MEDICAL CARE, AND HEATING?: NOT VERY HARD

## 2024-07-13 SDOH — ECONOMIC STABILITY: HOUSING INSECURITY: IN THE LAST 12 MONTHS, HOW MANY PLACES HAVE YOU LIVED?: 0

## 2024-07-13 SDOH — ECONOMIC STABILITY: INCOME INSECURITY: IN THE LAST 12 MONTHS, WAS THERE A TIME WHEN YOU WERE NOT ABLE TO PAY THE MORTGAGE OR RENT ON TIME?: NO

## 2024-07-13 SDOH — ECONOMIC STABILITY: FOOD INSECURITY: WITHIN THE PAST 12 MONTHS, THE FOOD YOU BOUGHT JUST DIDN'T LAST AND YOU DIDN'T HAVE MONEY TO GET MORE.: NEVER TRUE

## 2024-07-13 SDOH — HEALTH STABILITY: PHYSICAL HEALTH: ON AVERAGE, HOW MANY DAYS PER WEEK DO YOU ENGAGE IN MODERATE TO STRENUOUS EXERCISE (LIKE A BRISK WALK)?: 1 DAY

## 2024-07-13 ASSESSMENT — SOCIAL DETERMINANTS OF HEALTH (SDOH)
WITHIN THE PAST 12 MONTHS, YOU WORRIED THAT YOUR FOOD WOULD RUN OUT BEFORE YOU GOT THE MONEY TO BUY MORE: NEVER TRUE
HOW OFTEN DO YOU ATTEND CHURCH OR RELIGIOUS SERVICES?: MORE THAN 4 TIMES PER YEAR
DO YOU BELONG TO ANY CLUBS OR ORGANIZATIONS SUCH AS CHURCH GROUPS UNIONS, FRATERNAL OR ATHLETIC GROUPS, OR SCHOOL GROUPS?: YES
IN A TYPICAL WEEK, HOW MANY TIMES DO YOU TALK ON THE PHONE WITH FAMILY, FRIENDS, OR NEIGHBORS?: MORE THAN THREE TIMES A WEEK
HOW OFTEN DO YOU GET TOGETHER WITH FRIENDS OR RELATIVES?: ONCE A WEEK
DO YOU BELONG TO ANY CLUBS OR ORGANIZATIONS SUCH AS CHURCH GROUPS UNIONS, FRATERNAL OR ATHLETIC GROUPS, OR SCHOOL GROUPS?: YES
HOW OFTEN DO YOU ATTENT MEETINGS OF THE CLUB OR ORGANIZATION YOU BELONG TO?: MORE THAN 4 TIMES PER YEAR
IN A TYPICAL WEEK, HOW MANY TIMES DO YOU TALK ON THE PHONE WITH FAMILY, FRIENDS, OR NEIGHBORS?: MORE THAN THREE TIMES A WEEK
HOW OFTEN DO YOU ATTENT MEETINGS OF THE CLUB OR ORGANIZATION YOU BELONG TO?: MORE THAN 4 TIMES PER YEAR
HOW OFTEN DO YOU HAVE SIX OR MORE DRINKS ON ONE OCCASION: NEVER
HOW MANY DRINKS CONTAINING ALCOHOL DO YOU HAVE ON A TYPICAL DAY WHEN YOU ARE DRINKING: PATIENT DOES NOT DRINK
IN THE PAST 12 MONTHS, HAS THE ELECTRIC, GAS, OIL, OR WATER COMPANY THREATENED TO SHUT OFF SERVICE IN YOUR HOME?: NO
HOW OFTEN DO YOU HAVE A DRINK CONTAINING ALCOHOL: NEVER
HOW HARD IS IT FOR YOU TO PAY FOR THE VERY BASICS LIKE FOOD, HOUSING, MEDICAL CARE, AND HEATING?: NOT VERY HARD
HOW OFTEN DO YOU ATTEND CHURCH OR RELIGIOUS SERVICES?: MORE THAN 4 TIMES PER YEAR
HOW OFTEN DO YOU GET TOGETHER WITH FRIENDS OR RELATIVES?: ONCE A WEEK

## 2024-07-13 ASSESSMENT — LIFESTYLE VARIABLES
HOW MANY STANDARD DRINKS CONTAINING ALCOHOL DO YOU HAVE ON A TYPICAL DAY: PATIENT DOES NOT DRINK
AUDIT-C TOTAL SCORE: 0
HOW OFTEN DO YOU HAVE A DRINK CONTAINING ALCOHOL: NEVER
HOW OFTEN DO YOU HAVE SIX OR MORE DRINKS ON ONE OCCASION: NEVER
SKIP TO QUESTIONS 9-10: 1

## 2024-07-15 ENCOUNTER — APPOINTMENT (OUTPATIENT)
Dept: MEDICAL GROUP | Facility: PHYSICIAN GROUP | Age: 49
End: 2024-07-15
Payer: COMMERCIAL

## 2024-07-15 VITALS
OXYGEN SATURATION: 97 % | HEART RATE: 66 BPM | BODY MASS INDEX: 26.08 KG/M2 | WEIGHT: 186.3 LBS | TEMPERATURE: 98.3 F | SYSTOLIC BLOOD PRESSURE: 128 MMHG | HEIGHT: 71 IN | DIASTOLIC BLOOD PRESSURE: 78 MMHG

## 2024-07-15 DIAGNOSIS — Z00.00 ANNUAL PHYSICAL EXAM: ICD-10-CM

## 2024-07-15 DIAGNOSIS — R73.03 PREDIABETES: Chronic | ICD-10-CM

## 2024-07-15 DIAGNOSIS — Z11.4 SCREENING FOR HIV (HUMAN IMMUNODEFICIENCY VIRUS): ICD-10-CM

## 2024-07-15 DIAGNOSIS — E78.5 DYSLIPIDEMIA: Chronic | ICD-10-CM

## 2024-07-15 DIAGNOSIS — Z23 NEED FOR VACCINATION: ICD-10-CM

## 2024-07-15 DIAGNOSIS — I10 ESSENTIAL HYPERTENSION: Chronic | ICD-10-CM

## 2024-07-15 PROCEDURE — 3074F SYST BP LT 130 MM HG: CPT | Performed by: INTERNAL MEDICINE

## 2024-07-15 PROCEDURE — 90746 HEPB VACCINE 3 DOSE ADULT IM: CPT | Performed by: INTERNAL MEDICINE

## 2024-07-15 PROCEDURE — 3078F DIAST BP <80 MM HG: CPT | Performed by: INTERNAL MEDICINE

## 2024-07-15 PROCEDURE — 90471 IMMUNIZATION ADMIN: CPT | Performed by: INTERNAL MEDICINE

## 2024-07-15 PROCEDURE — 99396 PREV VISIT EST AGE 40-64: CPT | Mod: 25 | Performed by: INTERNAL MEDICINE

## 2024-07-15 RX ORDER — AMLODIPINE BESYLATE 10 MG/1
10 TABLET ORAL
Qty: 90 TABLET | Refills: 3 | Status: SHIPPED | OUTPATIENT
Start: 2024-07-15

## 2024-07-15 ASSESSMENT — PATIENT HEALTH QUESTIONNAIRE - PHQ9: CLINICAL INTERPRETATION OF PHQ2 SCORE: 0

## 2024-07-15 ASSESSMENT — FIBROSIS 4 INDEX: FIB4 SCORE: 0.86

## 2025-03-01 ENCOUNTER — HOSPITAL ENCOUNTER (EMERGENCY)
Facility: MEDICAL CENTER | Age: 50
End: 2025-03-01
Attending: EMERGENCY MEDICINE
Payer: OTHER MISCELLANEOUS

## 2025-03-01 VITALS
BODY MASS INDEX: 24.72 KG/M2 | WEIGHT: 176.59 LBS | RESPIRATION RATE: 18 BRPM | DIASTOLIC BLOOD PRESSURE: 90 MMHG | HEIGHT: 71 IN | TEMPERATURE: 98.2 F | HEART RATE: 75 BPM | OXYGEN SATURATION: 96 % | SYSTOLIC BLOOD PRESSURE: 135 MMHG

## 2025-03-01 DIAGNOSIS — S81.852A DOG BITE OF LEFT LOWER LEG, INITIAL ENCOUNTER: ICD-10-CM
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
## 2025-03-01 PROCEDURE — A9270 NON-COVERED ITEM OR SERVICE: HCPCS | Performed by: EMERGENCY MEDICINE

## 2025-03-01 PROCEDURE — 90471 IMMUNIZATION ADMIN: CPT

## 2025-03-01 PROCEDURE — 700102 HCHG RX REV CODE 250 W/ 637 OVERRIDE(OP): Performed by: EMERGENCY MEDICINE

## 2025-03-01 PROCEDURE — 90715 TDAP VACCINE 7 YRS/> IM: CPT | Performed by: EMERGENCY MEDICINE

## 2025-03-01 PROCEDURE — 700111 HCHG RX REV CODE 636 W/ 250 OVERRIDE (IP): Performed by: EMERGENCY MEDICINE

## 2025-03-01 PROCEDURE — 99283 EMERGENCY DEPT VISIT LOW MDM: CPT

## 2025-03-01 RX ADMIN — CLOSTRIDIUM TETANI TOXOID ANTIGEN (FORMALDEHYDE INACTIVATED), CORYNEBACTERIUM DIPHTHERIAE TOXOID ANTIGEN (FORMALDEHYDE INACTIVATED), BORDETELLA PERTUSSIS TOXOID ANTIGEN (GLUTARALDEHYDE INACTIVATED), BORDETELLA PERTUSSIS FILAMENTOUS HEMAGGLUTININ ANTIGEN (FORMALDEHYDE INACTIVATED), BORDETELLA PERTUSSIS PERTACTIN ANTIGEN, AND BORDETELLA PERTUSSIS FIMBRIAE 2/3 ANTIGEN 0.5 ML: 5; 2; 2.5; 5; 3; 5 INJECTION, SUSPENSION INTRAMUSCULAR at 19:45

## 2025-03-01 RX ADMIN — AMOXICILLIN AND CLAVULANATE POTASSIUM 1 TABLET: 875; 125 TABLET, FILM COATED ORAL at 19:44

## 2025-03-01 NOTE — LETTER
"    EMPLOYEE’S CLAIM FOR COMPENSATION/ REPORT OF INITIAL TREATMENT  FORM C-4  PLEASE TYPE OR PRINT    EMPLOYEE’S CLAIM - PROVIDE ALL INFORMATION REQUESTED   First Name                    SELAM Maria                  Last Name  Lc Birthdate                    1975                Sex  Male Claim Number (Insurer’s Use Only)     Home Address  [address] Age  49 y.o. Height  1.803 m (5' 11\") Weight  80.1 kg (176 lb 9.4 oz) Social Security Number     [facility] Zip  [address] Telephone  [phone] (home) [phone] (work)   [place]  [address] [place] [place]  [address] Primary Language Spoken  English    INSURER   [employer]   [employer]   Employee's Occupation (Job Title) When Injury or Occupational Disease Occurred      Employer's Name/Company Name  [employer]  Telephone  [phone]    Office Mail Address (Number and Street)  [address]     Date of Injury (if applicable) 3/1/2025               Hours Injury (if applicable)  1:35 PM Date Employer Notified  3/1/2025 Last Day of Work after Injury or Occupational Disease  3/1/2025 Supervisor to Whom Injury Reported  [employer]   Address or Location of Accident (if applicable)  Work [1]   What were you doing at the time of accident? 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 (Be specific and answer in detail. Use additional sheet if necessary)  When I was about to enter the building, there was a nikolai with two dogs and one of them bit me   If you believe that you have an occupational disease, when did you first have knowledge of the disability and its relationship to your employment?   Witnesses to the Accident (if applicable)  the dog owner and girlfriend (laura)      Nature of Injury or Occupational " Disease  Laceration  Part(s) of Body Injured or Affected  Lower Leg (L) Upper Leg (L) N/A    I CERTIFY THAT THE ABOVE IS TRUE AND CORRECT TO T HE BEST OF MY KNOWLEDGE AND THAT I HAVE PROVIDED THIS INFORMATION IN ORDER TO OBTAIN THE BENEFITS OF [place], OR [place]  I HEREBY AUTHORIZE ANY PHYSICIAN, CHIROPRACTOR, SURGEON, PRACTITIONER OR ANY OTHER PERSON, ANY HOSPITAL, INCLUDING [facility] OR [facility], ANY  MEDICAL SERVICE ORGANIZATION, ANY INSURANCE COMPANY, OR OTHER INSTITUTION OR ORGANIZATION TO RELEASE TO EACH OTHER, ANY MEDICAL OR OTHER INFORMATION, INCLUDING BENEFITS PAID OR PAYABLE, PERTINENT TO THIS INJURY OR DISEASE, EXCEPT INFORMATION RELATIVE TO DIAGNOSIS, TREATMENT AND/OR COUNSELING FOR AIDS, PSYCHOLOGICAL CONDITIONS, ALCOHOL OR CONTROLLED SUBSTANCES, FOR WHICH I MUST GIVE SPECIFIC AUTHORIZATION.  A PHOTOSTAT OF THIS AUTHORIZATION SHALL BE VALID AS THE ORIGINAL.     Date 03/01/25   [facility] Employee’s Original or  *Electronic Signature   THIS REPORT MUST BE COMPLETED AND MAILED WITHIN 3 WORKING DAYS OF TREATMENT   Place  [facility], EMERGENCY DEPT    Name of Facility   [facility]   Date 3/1/2025 Diagnosis and Description of Injury or Occupational Disease  (S81.852A,  W54.0XXA) Dog bite of left lower leg, initial encounter  The encounter diagnosis was Dog bite of left lower leg, initial encounter. Is there evidence that the injured employee was under the influence of alcohol and/or another controlled substance at the time of accident?  [x]No  [] Yes (if yes, please explain)   Hour   No   Treatment: Antibiotics, TDAP    Have you advised the patient to remain off work five days or more?   [] Yes  [x] No        No           If yes, indicate dates: From   To      If no, is the injured employee capable of: [] full duty Yes   []  modified duty    If yes to modified duty, specify any limitations / restrictions:       X-Ray Findings:      From information given by the employee, together with medical evidence, can you directly connect this injury or occupational disease as job incurred?  [x]Yes   [] No Yes    Is additional medical care by a physician indicated? [x]Yes [] No  Yes    Do you know of any previous injury or disease contributing to this condition or occupational disease? []Yes [x] No (Explain if yes)                          No   Date  3/1/2025 Print Health Care Provider’s Name  No name on file I certify that the employer’s copy of  this form was delivered to the employer on:   Address [address]      INSURER'S USE ONLY                       [facility] Zip   [address] Provider’s Tax ID Number   [id number]   Telephone  Dept: [phone]    Health Care Provider’s Original or Electronic Signature  liliana-ALEX Fernández M.D. Degree (MD,DO, DC,PA-C,APRN)  {Provider Degrees:[id number]}  Choose (if applicable)  M.[email or web address]    ORIGINAL - TREATING HEALTHCARE PROVIDER PAGE 2 - INSURER/TPA PAGE 3 - EMPLOYER PAGE 4 - EMPLOYEE             Form C-4 (rev.08/23)

## 2025-03-02 NOTE — ED TRIAGE NOTES
"Chief Complaint   Patient presents with    Dog Bite     L calf, pt had pants and long socks covering his calf at the time, two very small abrasions noted to calf, dog's owner states the dog is up to date on vaccines      Pt ambulatory to triage for above complaints, VSS on RA, GCS 15, NAD.    Pt was working at the time and is filing workman's comp.    Pt returned to [facility]. Educated on triage process and to inform staff of any changes.     BP (!) 153/99   Pulse 76   Temp 36.7 °C (98.1 °F) (Temporal)   Resp 16   Ht 1.803 m (5' 11\")   Wt 80.1 kg (176 lb 9.4 oz)   SpO2 96%   BMI 24.63 kg/m²     "

## 2025-03-02 NOTE — ED PROVIDER NOTES
"ED Provider Note    CHIEF COMPLAINT  Chief Complaint   Patient presents with    Dog Bite     L calf, pt had pants and long socks covering his calf at the time, two very small abrasions noted to calf, dog's owner states the dog is up to date on vaccines      EXTERNAL RECORDS REVIEWED  Patient was seen on 07/15/2024 by primary provider.    HPI/ROS  LIMITATION TO HISTORY   Select: : None  OUTSIDE HISTORIAN(S):  None    Bertrand Platt is a 49 y.o. male with history of hypertension who presents to the Emergency Department following dog bite that occurred at 3:45 PM. 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  He has 2 minor puncture wounds on the back of the left calf.  No other injuries noted.  The dog's vaccinations are reportedly up to date per their owner. Patient is unsure about the status of his tetanus vaccine. Patient denies any other symptoms or injuries at this time.     PAST MEDICAL HISTORY  Past Medical History:   Diagnosis Date    HTN (hypertension)     Hypertension       SURGICAL HISTORY  History reviewed. No pertinent surgical history noted.     FAMILY HISTORY  Family History   Problem Relation Age of Onset    Hyperlipidemia Mother     Hypertension Mother     Diabetes Mother      SOCIAL HISTORY   reports that he quit smoking about 22 years ago. His smoking use included cigarettes. He started smoking about 26 years ago. He has never used smokeless tobacco. He reports that he does not drink alcohol and does not use drugs.    CURRENT MEDICATIONS  Previous Medications    AMLODIPINE (NORVASC) 10 MG TAB    Take 1 Tablet by mouth every day.     ALLERGIES  Patient has no known allergies noted.     PHYSICAL EXAM  BP (!) 153/99   Pulse 76   Temp 36.7 °C (98.1 °F) (Temporal)   Resp 16   Ht 1.803 m (5' 11\")   Wt 80.1 kg (176 lb 9.4 oz)   SpO2 96%      Constitutional: Nontoxic appearing. Alert in no apparent " distress.  HENT: Normocephalic, Atraumatic.  Moist mucous membranes.    Neck: Supple, full range of motion  Musculoskeletal: Atraumatic. No obvious deformities noted.   Skin: 2 very superficial puncture wounds to the left posterior calf. Warm, Dry.  No erythema, No rash.   Neurologic: Alert and oriented x3. Moving all extremities spontaneously without focal deficits.  Psychiatric: Affect normal, Mood normal, Appears appropriate and not intoxicated.    COURSE & MEDICAL DECISION MAKING    7:10 PM - Patient seen and examined at bedside. Discussed plan of care, including tetanus vaccine update and starting him on antibiotics as well as plan for discharge home after interventions. The patient will be medicated with Augmentin 875-125 mg PO.  Discussed results with patient and/or family as well as importance of primary care follow up. Patient understands plan of care and strict return precautions for new or changing symptoms. Patient verbalizes understanding and agreement to this plan of care.      ASSESSMENT, COURSE AND PLAN  Care Narrative: Patient presents following minor dog bite to the left calf that occurred earlier today while at work.  He is hypertensive on arrival with otherwise reassuring vital signs.  He has 2 very minor wounds on the back of the calf that almost do not penetrate the skin.  Considered imaging however there is no concern for underlying fracture or retained foreign body.  No other injuries are identified.  Will update the patient's tetanus vaccination and discharge on antibiotics.    ADDITIONAL PROBLEM LIST  Problem #1: Dog bite to the left lower extremity -given tetanus vaccination, discharged on Augmentin, follow-up with occupational health    Problem #2: Hypertension - discussed importance of home monitoring and outpatient follow up with primary provider.      DISPOSITION AND DISCUSSIONS    Escalation of care considered, and ultimately not performed:diagnostic imaging    Decision tools and  prescription drugs considered including, but not limited to: Pain Medications OTC medication should be sufficient .    DISPOSITION:  Patient will be discharged home in stable condition.    FOLLOW UP:  Gerardo Thibodeaux M.D.  [address] [facility] [address] [phone]      As needed    [facility], Emergency Dept  [address] [facility] [address] [address] [phone]    If symptoms worsen    OUTPATIENT MEDICATIONS:  New Prescriptions    AMOXICILLIN-CLAVULANATE (AUGMENTIN) 875-125 MG TAB    Take 1 Tablet by mouth 2 times a day for 5 days.     FINAL DIAGNOSIS  1. Dog bite of left lower leg, initial encounter      The note accurately reflects work and decisions made by me.  Mari Godwin M.D.  3/2/2025  8:37 AM     Roz VALLECILLO (Scribe), am scribing for, and in the presence of, Mari Godwin M.D..    Electronically signed by: Roz Bond (Rona), 3/1/2025    Mari VALLECILLO M.D. personally performed the services described in this documentation, as scribed by Roz Bond in my presence, and it is both accurate and complete.

## 2025-03-15 ENCOUNTER — HOSPITAL ENCOUNTER (OUTPATIENT)
Dept: LAB | Facility: MEDICAL CENTER | Age: 50
End: 2025-03-15
Attending: INTERNAL MEDICINE
Payer: COMMERCIAL

## 2025-03-15 DIAGNOSIS — R73.03 PREDIABETES: Chronic | ICD-10-CM

## 2025-03-15 DIAGNOSIS — Z11.4 SCREENING FOR HIV (HUMAN IMMUNODEFICIENCY VIRUS): ICD-10-CM

## 2025-03-15 DIAGNOSIS — E78.5 DYSLIPIDEMIA: Chronic | ICD-10-CM

## 2025-03-15 DIAGNOSIS — Z00.00 ANNUAL PHYSICAL EXAM: ICD-10-CM

## 2025-03-15 LAB
ANION GAP SERPL CALC-SCNC: 11 MMOL/L (ref 7–16)
BASOPHILS # BLD AUTO: 0.3 % (ref 0–1.8)
BASOPHILS # BLD: 0.02 K/UL (ref 0–0.12)
BUN SERPL-MCNC: 15 MG/DL (ref 8–22)
CALCIUM SERPL-MCNC: 9.4 MG/DL (ref 8.5–10.5)
CHLORIDE SERPL-SCNC: 103 MMOL/L (ref 96–112)
CHOLEST SERPL-MCNC: 145 MG/DL (ref 100–199)
CO2 SERPL-SCNC: 24 MMOL/L (ref 20–33)
CREAT SERPL-MCNC: 0.95 MG/DL (ref 0.5–1.4)
EOSINOPHIL # BLD AUTO: 0.1 K/UL (ref 0–0.51)
EOSINOPHIL NFR BLD: 1.3 % (ref 0–6.9)
ERYTHROCYTE [DISTWIDTH] IN BLOOD BY AUTOMATED COUNT: 43.1 FL (ref 35.9–50)
EST. AVERAGE GLUCOSE BLD GHB EST-MCNC: 123 MG/DL
FASTING STATUS PATIENT QL REPORTED: NORMAL
GFR SERPLBLD CREATININE-BSD FMLA CKD-EPI: 97 ML/MIN/1.73 M 2
GLUCOSE SERPL-MCNC: 93 MG/DL (ref 65–99)
HBA1C MFR BLD: 5.9 % (ref 4–5.6)
HCT VFR BLD AUTO: 50.6 % (ref 42–52)
HDLC SERPL-MCNC: 45 MG/DL
HGB BLD-MCNC: 16.2 G/DL (ref 14–18)
HIV 1+2 AB+HIV1 P24 AG SERPL QL IA: NORMAL
IMM GRANULOCYTES # BLD AUTO: 0.02 K/UL (ref 0–0.11)
IMM GRANULOCYTES NFR BLD AUTO: 0.3 % (ref 0–0.9)
LDLC SERPL CALC-MCNC: 84 MG/DL
LYMPHOCYTES # BLD AUTO: 1.91 K/UL (ref 1–4.8)
LYMPHOCYTES NFR BLD: 25 % (ref 22–41)
MCH RBC QN AUTO: 28.5 PG (ref 27–33)
MCHC RBC AUTO-ENTMCNC: 32 G/DL (ref 32.3–36.5)
MCV RBC AUTO: 89.1 FL (ref 81.4–97.8)
MONOCYTES # BLD AUTO: 0.53 K/UL (ref 0–0.85)
MONOCYTES NFR BLD AUTO: 6.9 % (ref 0–13.4)
NEUTROPHILS # BLD AUTO: 5.05 K/UL (ref 1.82–7.42)
NEUTROPHILS NFR BLD: 66.2 % (ref 44–72)
NRBC # BLD AUTO: 0 K/UL
NRBC BLD-RTO: 0 /100 WBC (ref 0–0.2)
PLATELET # BLD AUTO: 316 K/UL (ref 164–446)
PMV BLD AUTO: 9.3 FL (ref 9–12.9)
POTASSIUM SERPL-SCNC: 3.8 MMOL/L (ref 3.6–5.5)
PSA SERPL DL<=0.01 NG/ML-MCNC: 1.92 NG/ML (ref 0–4)
RBC # BLD AUTO: 5.68 M/UL (ref 4.7–6.1)
SODIUM SERPL-SCNC: 138 MMOL/L (ref 135–145)
TRIGL SERPL-MCNC: 81 MG/DL (ref 0–149)
WBC # BLD AUTO: 7.6 K/UL (ref 4.8–10.8)

## 2025-03-15 PROCEDURE — 87389 HIV-1 AG W/HIV-1&-2 AB AG IA: CPT

## 2025-03-15 PROCEDURE — 36415 COLL VENOUS BLD VENIPUNCTURE: CPT

## 2025-03-15 PROCEDURE — 85025 COMPLETE CBC W/AUTO DIFF WBC: CPT

## 2025-03-15 PROCEDURE — 84153 ASSAY OF PSA TOTAL: CPT

## 2025-03-15 PROCEDURE — 80048 BASIC METABOLIC PNL TOTAL CA: CPT

## 2025-03-15 PROCEDURE — 80061 LIPID PANEL: CPT

## 2025-03-15 PROCEDURE — 83036 HEMOGLOBIN GLYCOSYLATED A1C: CPT

## 2025-03-17 ENCOUNTER — RESULTS FOLLOW-UP (OUTPATIENT)
Dept: MEDICAL GROUP | Facility: PHYSICIAN GROUP | Age: 50
End: 2025-03-17
Payer: COMMERCIAL

## 2025-03-19 ENCOUNTER — OFFICE VISIT (OUTPATIENT)
Dept: MEDICAL GROUP | Facility: PHYSICIAN GROUP | Age: 50
End: 2025-03-19
Payer: COMMERCIAL

## 2025-03-19 VITALS
HEIGHT: 71 IN | OXYGEN SATURATION: 98 % | BODY MASS INDEX: 25.34 KG/M2 | TEMPERATURE: 97.7 F | DIASTOLIC BLOOD PRESSURE: 76 MMHG | WEIGHT: 181 LBS | RESPIRATION RATE: 16 BRPM | HEART RATE: 68 BPM | SYSTOLIC BLOOD PRESSURE: 118 MMHG

## 2025-03-19 DIAGNOSIS — R73.03 PREDIABETES: Chronic | ICD-10-CM

## 2025-03-19 DIAGNOSIS — I10 ESSENTIAL HYPERTENSION: Chronic | ICD-10-CM

## 2025-03-19 DIAGNOSIS — N52.9 ERECTILE DYSFUNCTION, UNSPECIFIED ERECTILE DYSFUNCTION TYPE: ICD-10-CM

## 2025-03-19 DIAGNOSIS — K63.5 POLYP OF COLON, UNSPECIFIED PART OF COLON, UNSPECIFIED TYPE: ICD-10-CM

## 2025-03-19 DIAGNOSIS — E78.5 DYSLIPIDEMIA: Chronic | ICD-10-CM

## 2025-03-19 DIAGNOSIS — Z23 NEED FOR VACCINATION: ICD-10-CM

## 2025-03-19 PROCEDURE — 3078F DIAST BP <80 MM HG: CPT | Performed by: INTERNAL MEDICINE

## 2025-03-19 PROCEDURE — 90677 PCV20 VACCINE IM: CPT | Performed by: INTERNAL MEDICINE

## 2025-03-19 PROCEDURE — 3074F SYST BP LT 130 MM HG: CPT | Performed by: INTERNAL MEDICINE

## 2025-03-19 PROCEDURE — 90471 IMMUNIZATION ADMIN: CPT | Performed by: INTERNAL MEDICINE

## 2025-03-19 PROCEDURE — 99214 OFFICE O/P EST MOD 30 MIN: CPT | Mod: 25 | Performed by: INTERNAL MEDICINE

## 2025-03-19 RX ORDER — TADALAFIL 5 MG/1
5 TABLET ORAL PRN
Qty: 10 TABLET | Refills: 3 | Status: SHIPPED | OUTPATIENT
Start: 2025-03-19

## 2025-03-19 RX ORDER — AMLODIPINE BESYLATE 10 MG/1
10 TABLET ORAL
Qty: 90 TABLET | Refills: 3 | Status: SHIPPED | OUTPATIENT
Start: 2025-03-19

## 2025-03-19 ASSESSMENT — PATIENT HEALTH QUESTIONNAIRE - PHQ9: CLINICAL INTERPRETATION OF PHQ2 SCORE: 0

## 2025-03-19 NOTE — ASSESSMENT & PLAN NOTE
Chronic condition.  Patient has been taking amlodipine.  Patient tolerating medication well.  Patient requesting Rx refill.  Patient denies chest pain shortness of breath palpitation or syncope

## 2025-03-19 NOTE — PROGRESS NOTES
PRIMARY CARE CLINIC VISIT        Chief Complaint   Patient presents with    Erectile Dysfunction    Follow-Up      Erectile dysfunction  Hyperlipidemia  Hypertension  Prediabetes  Vaccination status  Colon polyps  Lab test results  Pneumonia shot    History of Present Illness     Dyslipidemia  Chronic condition.  Patient currently on diet therapy.  Lab test result discussed with the patient.    Essential hypertension  Chronic condition.  Patient has been taking amlodipine.  Patient tolerating medication well.  Patient requesting Rx refill.  Patient denies chest pain shortness of breath palpitation or syncope    Prediabetes  Chronic condition.  Patient currently on diet therapy.  Recent lab test result discussed with the patient.    Erectile dysfunction  This is a new condition.  Patient reported erectile dysfunction with difficulty maintaining an erection for sexual intercourse.  Patient request prescription to treat this condition.  Today we discussed with the patient the options to try Viagra versus Cialis versus Levitra.  The patient would like to try Cialis.  Potential side effects of the medication discussed with the patient.    Need for vaccination  Recommended Pt to get shingles vaccine from the pharmacy as this is not available in the office.      Colon polyps  chronic condition.  Last colonoscopy completed July 2023.  GI did recommend for the patient to return in 2030 to repeat the colonoscopy.  Patient currently asymptomatic    No current outpatient medications on file prior to visit.     No current facility-administered medications on file prior to visit.        Allergies: Patient has no known allergies.    Current Outpatient Medications Ordered in Epic   Medication Sig Dispense Refill    tadalafil (CIALIS) 5 MG tablet Take 1 Tablet by mouth as needed for Erectile Dysfunction. 10 Tablet 3    amLODIPine (NORVASC) 10 MG Tab Take 1 Tablet by mouth every day. 90 Tablet 3     No current Epic-ordered  "facility-administered medications on file.       Past Medical History:   Diagnosis Date    HTN (hypertension)     Hypertension        History reviewed. No pertinent surgical history.    Family History   Problem Relation Age of Onset    Hyperlipidemia Mother     Hypertension Mother     Diabetes Mother        Social History     Tobacco Use   Smoking Status Former    Current packs/day: 0.00    Types: Cigarettes    Start date: 1998    Quit date: 2002    Years since quittin.7   Smokeless Tobacco Never   Tobacco Comments    smoked 3 cig/day, quit in        Social History     Substance and Sexual Activity   Alcohol Use No    Alcohol/week: 0.0 oz       Review of systems  As per HPI above. All other systems reviewed and negative.      Past Medical, Social, and Family history reviewed and updated in [facility]       LAB DATA:     I have independently reviewed / interpreted labs    Lab Results   Component Value Date/Time    HBA1C 5.9 (H) 03/15/2025 10:43 AM    HBA1C 5.7 (H) 2024 11:49 AM    HBA1C 5.7 (H) 2023 11:42 AM        Lab Results   Component Value Date/Time    WBC 7.6 03/15/2025 10:43 AM    HEMOGLOBIN 16.2 03/15/2025 10:43 AM    HEMATOCRIT 50.6 03/15/2025 10:43 AM    MCV 89.1 03/15/2025 10:43 AM    PLATELETCT 316 03/15/2025 10:43 AM       Lab Results   Component Value Date/Time    SODIUM 138 03/15/2025 10:43 AM    POTASSIUM 3.8 03/15/2025 10:43 AM    GLUCOSE 93 03/15/2025 10:43 AM    BUN 15 03/15/2025 10:43 AM    CREATININE 0.95 03/15/2025 10:43 AM       Lab Results   Component Value Date/Time    CHOLSTRLTOT 145 03/15/2025 10:43 AM    TRIGLYCERIDE 81 03/15/2025 10:43 AM    HDL 45 03/15/2025 10:43 AM    LDL 84 03/15/2025 10:43 AM       Lab Results   Component Value Date/Time    ALTSGPT 33 2023 11:42 AM          Objective     /76 (BP Location: Left arm, Patient Position: Sitting, BP Cuff Size: Adult)   Pulse 68   Temp 36.5 °C (97.7 °F) (Temporal)   Resp 16   Ht 1.803 m (5' 11\")   Wt " 82.1 kg (181 lb)   SpO2 98%    Body mass index is 25.24 kg/m².    General: alert in no apparent distress.  Cardiovascular: regular rate and rhythm  Pulmonary: lungs : no wheezing   Gastrointestinal: BS present.       Assessment and Plan     1. Erectile dysfunction, unspecified erectile dysfunction type  This is a new condition.  Unstable.  As above the patient requests a prescription for Cialis.  Potential side effect of medication discussed with the patient  - tadalafil (CIALIS) 5 MG tablet; Take 1 Tablet by mouth as needed for Erectile Dysfunction.  Dispense: 10 Tablet; Refill: 3    2. Dyslipidemia  - ALANINE AMINO-TRANS; Future  - Lipid Profile; Future  Chronic stable condition.  Continue with diet and exercise lab test result discussed with the patient  Repeat lab test in 6 months    3. Essential hypertension  - amLODIPine (NORVASC) 10 MG Tab; Take 1 Tablet by mouth every day.  Dispense: 90 Tablet; Refill: 3  Chronic stable BP normal today.  Continue amlodipine 10 mg daily    4. Prediabetes  - Basic Metabolic Panel; Future  - HEMOGLOBIN A1C; Future  Chronic condition.  Unstable.  A1c slightly elevated.  Recommend diet and lifestyle modification.  Continue to monitor    5. Polyp of colon, unspecified part of colon, unspecified type  Stable.  Patient to repeat colonoscopy 2030.    6. Need for vaccination  - Pneumococcal Conjugate Vaccine 20-Valent                    Please note that this dictation was created using voice recognition software. I have made every reasonable attempt to correct obvious errors, but I expect that there are errors of grammar and possibly content that I did not discover before finalizing the note.    Gerardo Thibodeaux MD  Internal Medicine  [facility]

## 2025-03-19 NOTE — ASSESSMENT & PLAN NOTE
chronic condition.  Last colonoscopy completed July 2023.  GI did recommend for the patient to return in 2030 to repeat the colonoscopy.  Patient currently asymptomatic

## 2025-03-19 NOTE — ASSESSMENT & PLAN NOTE
This is a new condition.  Patient reported erectile dysfunction with difficulty maintaining an erection for sexual intercourse.  Patient request prescription to treat this condition.  Today we discussed with the patient the options to try Viagra versus Cialis versus Levitra.  The patient would like to try Cialis.  Potential side effects of the medication discussed with the patient.

## 2025-03-26 ENCOUNTER — TELEPHONE (OUTPATIENT)
Dept: MEDICAL GROUP | Facility: PHYSICIAN GROUP | Age: 50
End: 2025-03-26
Payer: COMMERCIAL

## 2025-03-26 NOTE — TELEPHONE ENCOUNTER
DOCUMENTATION OF PAR STATUS:    1. Name of Medication & Dose: tadalafil 5mg tab     2. Name of Prescription Coverage Company & phone #: [employer]    3. Date Prior Auth Submitted: 3/26/25    4. What information was given to obtain insurance decision? Cover My Meds    5. Prior Auth Status? Pending    6. Patient Notified: yes

## 2025-03-27 ENCOUNTER — PATIENT MESSAGE (OUTPATIENT)
Dept: MEDICAL GROUP | Facility: PHYSICIAN GROUP | Age: 50
End: 2025-03-27
Payer: COMMERCIAL

## 2025-03-27 NOTE — TELEPHONE ENCOUNTER
FINAL PRIOR AUTHORIZATION STATUS:    1.  Name of Medication & Dose: tadalafil     2. Prior Auth Status: Denied.  Reason: meds for the use of ED are excluded from coverage.    3. Action Taken: Pharmacy Notified: no Patient Notified: yes

## 2025-04-01 RX ORDER — TADALAFIL 10 MG/1
10 TABLET ORAL
Qty: 10 TABLET | Refills: 3 | Status: SHIPPED | OUTPATIENT
Start: 2025-04-01 | End: 2025-04-02

## 2025-04-02 RX ORDER — TADALAFIL 5 MG/1
10 TABLET ORAL PRN
Qty: 20 TABLET | Refills: 3 | Status: SHIPPED | OUTPATIENT
Start: 2025-04-02

## 2025-04-14 ENCOUNTER — APPOINTMENT (OUTPATIENT)
Dept: MEDICAL GROUP | Facility: PHYSICIAN GROUP | Age: 50
End: 2025-04-14
Payer: COMMERCIAL

## 2025-04-30 ENCOUNTER — TELEPHONE (OUTPATIENT)
Dept: MEDICAL GROUP | Facility: PHYSICIAN GROUP | Age: 50
End: 2025-04-30
Payer: COMMERCIAL

## 2025-05-01 NOTE — TELEPHONE ENCOUNTER
DOCUMENTATION OF PAR STATUS:    1. Name of Medication & Dose: tadalafil 5mg tab     2. Name of Prescription Coverage Company & phone #: [employer]    3. Date Prior Auth Submitted: 4/30/25    4. What information was given to obtain insurance decision? Cover My Meds    5. Prior Auth Status? Pending    6. Patient Notified: yes

## 2025-05-07 RX ORDER — TADALAFIL 5 MG/1
10 TABLET ORAL PRN
Qty: 10 TABLET | Refills: 3 | Status: SHIPPED | OUTPATIENT
Start: 2025-05-07

## 2025-05-07 NOTE — TELEPHONE ENCOUNTER
It’s fine with me if I have to pay without insurance coverage as long as it’s the 10 tablets of 5mg. I’ll just take 2 tablets and under $20 dollars.

## 2025-05-07 NOTE — TELEPHONE ENCOUNTER
Received request via: Patient    Was the patient seen in the last year in this department? Yes    Does the patient have an active prescription (recently filled or refills available) for medication(s) requested? Yes. Quantity change    Pharmacy Name:   [facility]/pharmacy #[id number] - [place], [place] - [address] AT [facility]  [address] [address]  Phone: [phone] Fax: [phone]        Does the patient have long-term Plus and need 100-day supply? (This applies to ALL medications) Patient does not have SCP

## 2025-06-20 RX ORDER — TADALAFIL 5 MG/1
10 TABLET ORAL PRN
Qty: 10 TABLET | Refills: 3 | Status: SHIPPED | OUTPATIENT
Start: 2025-06-20

## 2025-06-23 ENCOUNTER — TELEPHONE (OUTPATIENT)
Dept: MEDICAL GROUP | Facility: PHYSICIAN GROUP | Age: 50
End: 2025-06-23
Payer: COMMERCIAL

## 2025-06-23 NOTE — TELEPHONE ENCOUNTER
DOCUMENTATION OF PAR STATUS:    1. Name of Medication & Dose: tadalafil 5mg tab     2. Name of Prescription Coverage Company & phone #: [employer]    3. Date Prior Auth Submitted: 6/23/25    4. What information was given to obtain insurance decision? Cover My Meds    5. Prior Auth Status? Pending    6. Patient Notified: yes

## 2025-06-26 NOTE — TELEPHONE ENCOUNTER
FINAL PRIOR AUTHORIZATION STATUS:    1.  Name of Medication & Dose: tadalafil     2. Prior Auth Status: denied: the use of medications for the treatment of sexual dysfunction or erectile dysfunction (ED) are excluded from coverage under the plan.    3. Action Taken: Pharmacy Notified: no Patient Notified: yes

## 2025-07-02 RX ORDER — TADALAFIL 20 MG/1
20 TABLET ORAL PRN
Qty: 10 TABLET | Refills: 3 | Status: SHIPPED | OUTPATIENT
Start: 2025-07-02

## 2025-08-04 RX ORDER — TADALAFIL 20 MG/1
20 TABLET ORAL PRN
Qty: 30 TABLET | Refills: 3 | Status: SHIPPED | OUTPATIENT
Start: 2025-08-04